# Patient Record
Sex: FEMALE | Race: WHITE | Employment: UNEMPLOYED | URBAN - METROPOLITAN AREA
[De-identification: names, ages, dates, MRNs, and addresses within clinical notes are randomized per-mention and may not be internally consistent; named-entity substitution may affect disease eponyms.]

---

## 2017-03-06 ENCOUNTER — TRANSFERRED RECORDS (OUTPATIENT)
Dept: HEALTH INFORMATION MANAGEMENT | Facility: CLINIC | Age: 52
End: 2017-03-06

## 2017-07-07 ENCOUNTER — CARE COORDINATION (OUTPATIENT)
Dept: SURGERY | Facility: CLINIC | Age: 52
End: 2017-07-07

## 2017-07-07 NOTE — PROGRESS NOTES
"Called and spoke to patient regarding records we received from [facility].     Patient states she has history of gastric bypass into 2006, as well as a GJ revision performed in 2012 for her ulcer. All her surgeries were performed by Dr. Barajas in [facility].      She is seeking our help for treatment of her ulcer,she states it is still not healed according to the EGD done in Spring of 2017, which was not sent with her records. She was upset that her provider placed her on a soft diet for six months and she states that she is just not able to do that and was very upset with how she was treated there.  She states that they accused her of smoking even though she was telling them she had quit. She does not drink caffeine, however, her notes from her clinic stated that she did show up to her appointments with mocha's and other coffee drinks.    She states \"They were lying in the notes they sent. I read them, that is why I decided to leave their clinic\".    She is currently taking Carafate four times a day as well as Prilosec 20 mg BID. She states her symptoms consist of pain in her mid line that radiate to the left of her abdomen, nonspecific. Nausea daily, she notes no triggers,  emesis 3 to 4 times a week with no triggers. She states she has been following doctor Leiva at [facility] but would like to transfer her care here for treatment of her ulcer. We do not have a referral from her primary doctor. She was asked to have a referral letter sent to us from her primary care provider listing all of her current health concerns, as well as reason she is pursing care here. She does state she is self referred, and was advised, this is required by our team. She will also work on getting her 2017 endoscopy report sent to us as well as any recent imaging done within the last year as well as a CD or get them electronically sent to us.     We will review her records and call her once we have a plan. Patient " verbalized understanding and agreed with plan.

## 2017-07-10 ENCOUNTER — CARE COORDINATION (OUTPATIENT)
Dept: SURGERY | Facility: CLINIC | Age: 52
End: 2017-07-10

## 2017-07-10 NOTE — PROGRESS NOTES
Reviewed records with Dr. Gonzalez. He is happy to see patient in clinic, however, patient needs to be aware that only procedure we may offer her is a reversal of her gastric bypass. Patient also needs to know that she will gain all her weigh back. If she is ok to discuss this, we can see her in clinic. Also, we need to have her recent EGD also.     Called and left message on patient voicemail with brief message requesting call back.

## 2017-07-13 NOTE — PROGRESS NOTES
"\"Reviewed records with Dr. Gonzalez. He is happy to see patient in clinic, however, patient needs to be aware that only procedure we may offer her is a reversal of her gastric bypass. Patient also needs to know that she will gain all her weigh back. If she is ok to discuss this, we can see her in clinic. Also, we need to have her recent EGD also. \"    Patient called back.  She states she is ok with gaining her weight back and discussing reversal. Ok with also possibly having a repeat EGD. She will continue to work on getting her referral letter to us as well as her recent EGD.    After we received this info we can schedule her for consult.  "

## 2017-08-30 ENCOUNTER — OFFICE VISIT (OUTPATIENT)
Dept: SURGERY | Facility: CLINIC | Age: 52
End: 2017-08-30

## 2017-08-30 ENCOUNTER — ALLIED HEALTH/NURSE VISIT (OUTPATIENT)
Dept: SURGERY | Facility: CLINIC | Age: 52
End: 2017-08-30

## 2017-08-30 VITALS
DIASTOLIC BLOOD PRESSURE: 56 MMHG | HEIGHT: 65 IN | SYSTOLIC BLOOD PRESSURE: 123 MMHG | OXYGEN SATURATION: 99 % | HEART RATE: 84 BPM | BODY MASS INDEX: 33.76 KG/M2 | WEIGHT: 202.6 LBS

## 2017-08-30 DIAGNOSIS — Z98.84 BARIATRIC SURGERY STATUS: Primary | ICD-10-CM

## 2017-08-30 RX ORDER — SUCRALFATE 1 G/1
TABLET ORAL 4 TIMES DAILY
COMMUNITY
End: 2018-02-07

## 2017-08-30 NOTE — LETTER
"2017       RE: Savanah Payton  [address]   [facility] [address]     Dear Colleague,    Thank you for referring your patient, Savanha Payton, to the [facility] SURGICAL WEIGHT MANAGEMENT at [facility]. Please see a copy of my visit note below.     New Bariatric Surgery Re-Establish Care/Referral Consultation      RE: Savanah Payton  MR#: [id number]  : 1965  ULISES: 2017      Requesting provider: Sapna Thompson    Chief Complaint/Reason for visit: evaluation for possible weight loss surgery    Dear No primary care provider on file.,    I had the pleasure of seeing your patient, Savanah Payton, today in our bariatric surgery clinic.  As you know, Savanah Payton is 52 year old.  She has a height of 5' 4.5\", a weight of 202 lbs 9.6 oz, and calculated Body mass index is 34.24 kg/(m^2)..  She has a history of weight loss surgery.    HISTORY OF PRESENT ILLNESS:  Past weight loss procedure: RYGB (laparoscopic) in  in [place], [place].; laparoscopic revision for marginal ulcers in   Weight before weight loss procedure was 314  Lowest weight after weight loss procedure was unknown 150  Weight today is 202 lbs 9.6 oz.  Patient is interested in establishing bariatric care: Yes  Patient is interested in possible revision bariatric procedure: Yes - reversal, not revision     For the past year her epigastric pain has returned.  This is the same pain that preceded her revision surgery in .  It is associated with nausea and retching.  She has had numerous EGD's, mostly at [place], the most recent of which (2016) showed a marginal ulceration on the intestinal side of her anastomosis.      Weight Loss History Reviewed with Patient 2017   How long have you been overweight? Since puberty   What is the most that you have ever weighed? 314   What is the most weight you have lost? 150   I have tried the following methods to lose weight Watching portions or " calories, Exercise, Weight Watchers, Atkins type diet (low carb/high protein), OTC Medications, Weight Loss Surgery   I have tried the following weight loss medications? (Check all that apply) None   Have you ever had weight loss surgery? Yes   Please select the type of weight loss surgery you had (select all that apply): gastric bypass / Aaron-en-Y       CO-MORBIDITIES OF OBESITY INCLUDE:  No past medical history on file.    No past surgical history on file.    FAMILY HISTORY:   No family history on file.    SOCIAL HISTORY:   Social History Questions Reviewed With Patient 8/18/2017   Which best describes your employment status (select all that apply) I am disabled   Which best describes your marital status:    Do you have children? Yes   Who do you have in your support network that can be available to help you for the first 2 weeks after surgery? Yes   Who can you count on for support throughout your weight loss surgery journey? Ex  and mom   Can you afford 3 meals a day?  Yes   Can you afford 50-60 dollars a month for vitamins? Yes       PSYCHOLOGICAL HISTORY:   Psychological History Reviewed With Patient 8/18/2017   Have you ever attempted suicide? Never.   Have you had thoughts of suicide in the past year? No   Have you ever been hospitalized for mental illness or a suicide attempt? Never.   Do you have a history of chronic pain? Yes   Have you ever been diagnosed with fibromyalgia? Yes   Are you currently being treated for any of the following? (select all that apply) Depression, Anxiety   Are you currently seeing a therapist or counselor?  No   Are you currently seeing a psychiatrist? No       ROS    MEDICATIONS:  Current Outpatient Prescriptions   Medication     sucralfate (CARAFATE) 1 GM tablet     Cyanocobalamin (VITAMIN B-12 PO)     LEVOTHYROXINE SODIUM PO     BusPIRone HCl (BUSPAR PO)     MAGNESIUM OXIDE PO     GABAPENTIN PO     RANITIDINE HCL PO     TOPIRAMATE PO     PROMETHAZINE HCL RE      "LISINOPRIL PO     calcium carb 1250 mg, 500 mg [place],/vitamin D 200 units (OSCAL WITH D) 500-200 MG-UNIT per tablet     No current facility-administered medications for this visit.        ALLERGIES:  Allergies   Allergen Reactions     Bactrim [Sulfamethoxazole W/Trimethoprim]      Prilosec [Omeprazole]        /56  Pulse 84  Ht 5' 4.5\"  Wt 202 lb 9.6 oz  SpO2 99%  BMI 34.24 kg/m2  PHYSICAL EXAM:  Neurological: A & O x 3  Eyes: PERRL  ENT: mucous membranes moist  Skin: warm and dry  Musculoskeletal: gait intact  Respiratory: Respirations unlabored  Abdomen: soft, tender to palpation in the epigastrium (very slight), obese, not distended    In summary, Savanah Payton is a 52 year old female who has a history of Gastric Bypass who is interested in reversal due to recurrent ulcer disease.    PLAN:    -smoking cessation  -EGD in the next few weeks/months  -continuing discussion on the possibility of gastric bypass reversal    FOLLOW-UP:   -following EGD    Prieto Prajapati MD  Chief Resident     I saw and evaluated the patient. I agree with the findings and the plan of care as documented in the resident s note.    Mango Gonzalez MD      "

## 2017-08-30 NOTE — PATIENT INSTRUCTIONS
It was a pleasure meeting with you today.     Thank you for allowing us the privilege of caring for you. We hope we provided you with the excellent service you deserve.     Please let us know if there is anything else we can do for you so that we can be sure you are leaving completely satisfied with your care experience.      You saw Dr Gonzalez today.     Instructions per today's visit:     To schedule your endoscopy with Dr Gonzalez, call Jorge at [phone].  Date:__________  Time:__________    Endoscopy Instructions:  -Have nothing to eat or drink for 6 hours prior to the check-in time.   -Check-in 1 hour before the procedure time.  -Please bring a  to escort you home after the procedure.  -You cannot drive for 24 hours after the procedure due to the sedation.  -The Endoscopy Department is located on the 1st floor of the [facility].  -The [facility] is at [address] [place] [address].    -Ph: [phone] for directions or nursing questions.  -[place] [employer] parking is available for the same price as parking in the [employer] ramp.      To schedule appointments with our team, please call [phone] option #1    Please call during clinic hours Monday through Friday 8:00a - 4:00p if you have questions or you can contact us via [employer] at anytime.      Nurses: [phone] Option # 3 for nurse advice line.  Fax: [phone]  Surgery Scheduler: [phone]    Please call the hospital at [phone] to speak with our on call MDs if you have urgent needs after hours, during weekends, or holidays.

## 2017-08-30 NOTE — PATIENT INSTRUCTIONS
Goals:  1) Consume 60 grams of protein/day. Re-start protein shakes for meal replacements.   2) Start trying soft-solid protein as tolerated.   3) Sip on 48-64 oz of calorie-free/caffeine-free fluids/day- between meals only.  4) Eat slowly (>20 min/meal), chewing foods well (to applesauce-like consistency).  5) Limit portions to 1 cup/meal.  6) Take the following after a Aaron-en-y Gastric Bypass:    Multivitamin/minerals: adult dose 2 times daily    Calcium Citrate containing vitamin D: 500 mg 3 times daily or 600 mg 2 times daily    Vitamin B12: sublingual form of at least 500 mcg daily or injection of 1000 mcg monthly     Vitamin D3: 1,000 International Units 2x/day    Iron: 45-60mg daily    Ambreen Estrella RD, LD  Voicemail: [phone]  Appointments: [phone]

## 2017-08-30 NOTE — NURSING NOTE
"Chief Complaint   Patient presents with     New Patient     Ulcer       Vitals:    08/30/17 1249   BP: 123/56   Pulse: 84   SpO2: 99%   Weight: 202 lb 9.6 oz   Height: 5' 4.5\"       Body mass index is 34.24 kg/(m^2).    Ashlee Alicea CMA                          "

## 2017-08-30 NOTE — PROGRESS NOTES
"New Bariatric Surgery Nutrition Consult  Reason For Visit:  Savanah Payton is a 52 year old female presenting today for a nutrition consult, s/p RNY GB in 2006 (in [place]). Pt referred by Dr. Gonzalez (8/30/17).    Anthropometrics:  Highest Weight before surgery: 314 lbs  Lowest Weight since surgery: 150 lbs  Current Weight: 202.6 lbs    Nutrition History:  Current Vitamins/Minerals: Calcium citrate, Mag Ox, 2,500mcg Sublingual vitamin B12, 50 mcg vitamin K3, 1,000 International Units vitamin D3 2x/day. Pt reports taking vitamins 2-3 days a week.     Pt with chronic ulcers making it difficult to eat most solid foods, especially protein.     Nutrition Prescription:  Grams Protein: 60 (minimum)  Amount of Fluid: 48-64 oz    Nutrition Diagnosis  Obesity r/t long history of self-monitoring deficit and excessive energy intake with history of RNY GB aeb BMI >30.    Intervention  Materials/Education provided on the bariatric maintenance diet, protein intake, fluid intake, eating pace, chewing foods well, portion control, sugar/fat intake, and recommended vitamin/mineral supplements. Gave encouragement and support. Requested annual bariatric labs be ordered. Provided pt with \"Maintenance Diet After Bariatric Surgery\" handout, \"Sources of Protein\" handout, \"After Gastric Bypass: Vitamin and Mineral Supplements\" handout, list of goals, and RD contact information.     Patient Understanding: Good  Expected Compliance: Good    Goals:  1) Consume 60 grams of protein/day. Re-start protein shakes for meal replacements.   2) Start trying soft-solid protein as tolerated.   3) Sip on 48-64 oz of calorie-free/caffeine-free fluids/day- between meals only.  4) Eat slowly (>20 min/meal), chewing foods well (to applesauce-like consistency).  5) Limit portions to 1 cup/meal.  6) Take the following after a Aaron-en-y Gastric Bypass:    Multivitamin/minerals: adult dose 2 times daily    Calcium Citrate containing vitamin D: 500 mg 3 times " daily or 600 mg 2 times daily    Vitamin B12: sublingual form of at least 500 mcg daily or injection of 1000 mcg monthly     Vitamin D3: 1,000 International Units 2x/day    Iron: 45-60mg daily       Follow-Up: prn    Time spent with patient: 30 minutes    Rose Mary Estrella RD, LD  Pager: [phone]

## 2017-08-30 NOTE — MR AVS SNAPSHOT
After Visit Summary   8/30/2017    Savanah Payton    MRN: [id number]           Patient Information     Date Of Birth          1965        Visit Information        Provider Department      8/30/2017 1:30 PM Mango Gonzalez MD [facility] Surgical Weight Management        Care Instructions    It was a pleasure meeting with you today.     Thank you for allowing us the privilege of caring for you. We hope we provided you with the excellent service you deserve.     Please let us know if there is anything else we can do for you so that we can be sure you are leaving completely satisfied with your care experience.      You saw Dr Gonzalez today.     Instructions per today's visit:     To schedule your endoscopy with Dr Gonzalez, call Jorge at [phone].  Date:__________  Time:__________    Endoscopy Instructions:  -Have nothing to eat or drink for 6 hours prior to the check-in time.   -Check-in 1 hour before the procedure time.  -Please bring a  to escort you home after the procedure.  -You cannot drive for 24 hours after the procedure due to the sedation.  -The Endoscopy Department is located on the 1st floor of the [facility].  -The [facility] is at [address] [place] [address].    -Ph: [phone] for directions or nursing questions.  -[employer] parking is available for the same price as parking in the [employer] ramp.      To schedule appointments with our team, please call [phone] option #1    Please call during clinic hours Monday through Friday 8:00a - 4:00p if you have questions or you can contact us via [employer] at anytime.      Nurses: [phone] Option # 3 for nurse advice line.  Fax: [phone]  Surgery Scheduler: [phone]    Please call the [facility] at [phone] to speak with our on call MDs if you have urgent needs after hours, during weekends, or holidays.              Follow-ups after your visit       "  Who to contact     Please call your clinic at [phone] to:    Ask questions about your health    Make or cancel appointments    Discuss your medicines    Learn about your test results    Speak to your doctor   If you have compliments or concerns about an experience at your clinic, or if you wish to file a complaint, please contact [facility] Patient Relations at [phone] or email us at [email or web address]         Additional Information About Your Visit        [employer] Information     [employer] gives you secure access to your electronic health record. If you see a primary care provider, you can also send messages to your care team and make appointments. If you have questions, please call your primary care clinic.  If you do not have a primary care provider, please call [phone] and they will assist you.      [employer] is an electronic gateway that provides easy, online access to your medical records. With [employer], you can request a clinic appointment, read your test results, renew a prescription or communicate with your care team.     To access your existing account, please contact your [facility] or call [phone] for assistance.        Care EveryWhere ID     This is your Care EveryWhere ID. This could be used by other organizations to access your [place] medical records  [id number]        Your Vitals Were     Pulse Height Pulse Oximetry BMI (Body Mass Index)          84 1.638 m (5' 4.5\") 99% 34.24 kg/m2         Blood Pressure from Last 3 Encounters:   08/30/17 123/56    Weight from Last 3 Encounters:   08/30/17 91.9 kg (202 lb 9.6 oz)              Today, you had the following     No orders found for display       Primary Care Provider    None Specified       No primary provider on file.        Equal Access to Services     VARSHA HARKINS AH: johnson Luong, david che " justice fierrolenorelayo coleman'aan ah. So [facility] [phone].    ATENCIÓN: Si oraliala carolina, tiene a kearns disposición servicios gratuitos de asistencia lingüística. Paramjit al [phone].    We comply with applicable federal civil rights laws and [place] laws. We do not discriminate on the basis of race, color, national origin, age, disability sex, sexual orientation or gender identity.            Thank you!     Thank you for choosing [facility] SURGICAL WEIGHT MANAGEMENT  for your care. Our goal is always to provide you with excellent care. Hearing back from our patients is one way we can continue to improve our services. Please take a few minutes to complete the written survey that you may receive in the mail after your visit with us. Thank you!             Your Updated Medication List - Protect others around you: Learn how to safely use, store and throw away your medicines at www.disposemymeds.org.          This list is accurate as of: 8/30/17  2:01 PM.  Always use your most recent med list.                   Brand Name Dispense Instructions for use Diagnosis    BUSPAR PO      Take 15 mg by mouth 2 times daily        calcium carb 1250 mg (500 mg [place])/vitamin D 200 units 500-200 MG-UNIT per tablet    OSCAL with D     Take 1 tablet by mouth 2 times daily (with meals)        GABAPENTIN PO      Take 800 mg by mouth 4 times daily        LEVOTHYROXINE SODIUM PO      Take 150 mcg by mouth daily        LISINOPRIL PO      Take 10 mg by mouth daily        MAGNESIUM OXIDE PO      Take 500 mg by mouth daily        PROMETHAZINE HCL RE      Place 25 mg rectally daily        RANITIDINE HCL PO      Take 150 mg by mouth 2 times daily        sucralfate 1 GM tablet    CARAFATE     Take by mouth 4 times daily        TOPIRAMATE PO      Take 100 mg by mouth 3 times daily        VITAMIN B-12 PO      Take 2,500 mcg by mouth daily

## 2017-08-30 NOTE — MR AVS SNAPSHOT
MRN:[id number]                      After Visit Summary   8/30/2017    Savanah Payton    MRN: [id number]           Visit Information        Provider Department      8/30/2017 2:00 PM Rose Mary Estrella RD [facility] Surgical Weight Management        Care Instructions    Goals:  1) Consume 60 grams of protein/day. Re-start protein shakes for meal replacements.   2) Start trying soft-solid protein as tolerated.   3) Sip on 48-64 oz of calorie-free/caffeine-free fluids/day- between meals only.  4) Eat slowly (>20 min/meal), chewing foods well (to applesauce-like consistency).  5) Limit portions to 1 cup/meal.  6) Take the following after a Aaron-en-y Gastric Bypass:    Multivitamin/minerals: adult dose 2 times daily    Calcium Citrate containing vitamin D: 500 mg 3 times daily or 600 mg 2 times daily    Vitamin B12: sublingual form of at least 500 mcg daily or injection of 1000 mcg monthly     Vitamin D3: 1,000 International Units 2x/day    Iron: 45-60mg daily    Ambreen Estrella RD, LD  Voicemail: [phone]  Appointments: [phone]           [employer] Information     [employer] gives you secure access to your electronic health record. If you see a primary care provider, you can also send messages to your care team and make appointments. If you have questions, please call your primary care clinic.  If you do not have a primary care provider, please call [phone] and they will assist you.      [employer] is an electronic gateway that provides easy, online access to your medical records. With [employer], you can request a clinic appointment, read your test results, renew a prescription or communicate with your care team.     To access your existing account, please contact your [facility] or call [phone] for assistance.        Care EveryWhere ID     This is your Care EveryWhere ID. This could be used by other organizations to access your [facility] medical records  [id number]         Equal Access to Services     VARSHA HARKINS : Alfred Nguyễn, wahalima key, david che. So [facility] [phone].    ATENCIÓN: Si habla español, tiene a kearns disposición servicios gratuitos de asistencia lingüística. Llame al [phone].    We comply with applicable federal civil rights laws and [place] laws. We do not discriminate on the basis of race, color, national origin, age, disability sex, sexual orientation or gender identity.

## 2017-08-30 NOTE — PROGRESS NOTES
" New Bariatric Surgery Re-Establish Care/Referral Consultation      RE: Savanah Payton  MR#: [id number]  : 1965  ULISES: 2017      Requesting provider: Sapna Thompson    Chief Complaint/Reason for visit: evaluation for possible weight loss surgery    Dear No primary care provider on file.,    I had the pleasure of seeing your patient, Savanah Payton, today in our bariatric surgery clinic.  As you know, Savanah Payton is 52 year old.  She has a height of 5' 4.5\", a weight of 202 lbs 9.6 oz, and calculated Body mass index is 34.24 kg/(m^2)..  She has a history of weight loss surgery.    HISTORY OF PRESENT ILLNESS:  Past weight loss procedure: RYGB (laparoscopic) in  in [place], [place].; laparoscopic revision for marginal ulcers in   Weight before weight loss procedure was 314  Lowest weight after weight loss procedure was unknown 150  Weight today is 202 lbs 9.6 oz.  Patient is interested in establishing bariatric care: Yes  Patient is interested in possible revision bariatric procedure: Yes - reversal, not revision     For the past year her epigastric pain has returned.  This is the same pain that preceded her revision surgery in .  It is associated with nausea and retching.  She has had numerous EGD's, mostly at [facility], the most recent of which (2016) showed a marginal ulceration on the intestinal side of her anastomosis.      Weight Loss History Reviewed with Patient 2017   How long have you been overweight? Since puberty   What is the most that you have ever weighed? 314   What is the most weight you have lost? 150   I have tried the following methods to lose weight Watching portions or calories, Exercise, Weight Watchers, Atkins type diet (low carb/high protein), OTC Medications, Weight Loss Surgery   I have tried the following weight loss medications? (Check all that apply) None   Have you ever had weight loss surgery? Yes   Please select the type of weight loss surgery you had " (select all that apply): gastric bypass / Aaron-en-Y       CO-MORBIDITIES OF OBESITY INCLUDE:  No past medical history on file.    No past surgical history on file.    FAMILY HISTORY:   No family history on file.    SOCIAL HISTORY:   Social History Questions Reviewed With Patient 8/18/2017   Which best describes your employment status (select all that apply) I am disabled   Which best describes your marital status:    Do you have children? Yes   Who do you have in your support network that can be available to help you for the first 2 weeks after surgery? Yes   Who can you count on for support throughout your weight loss surgery journey? Ex  and mom   Can you afford 3 meals a day?  Yes   Can you afford 50-60 dollars a month for vitamins? Yes       PSYCHOLOGICAL HISTORY:   Psychological History Reviewed With Patient 8/18/2017   Have you ever attempted suicide? Never.   Have you had thoughts of suicide in the past year? No   Have you ever been hospitalized for mental illness or a suicide attempt? Never.   Do you have a history of chronic pain? Yes   Have you ever been diagnosed with fibromyalgia? Yes   Are you currently being treated for any of the following? (select all that apply) Depression, Anxiety   Are you currently seeing a therapist or counselor?  No   Are you currently seeing a psychiatrist? No       ROS    MEDICATIONS:  Current Outpatient Prescriptions   Medication     sucralfate (CARAFATE) 1 GM tablet     Cyanocobalamin (VITAMIN B-12 PO)     LEVOTHYROXINE SODIUM PO     BusPIRone HCl (BUSPAR PO)     MAGNESIUM OXIDE PO     GABAPENTIN PO     RANITIDINE HCL PO     TOPIRAMATE PO     PROMETHAZINE HCL RE     LISINOPRIL PO     calcium carb 1250 mg, 500 mg [place],/vitamin D 200 units (OSCAL WITH D) 500-200 MG-UNIT per tablet     No current facility-administered medications for this visit.        ALLERGIES:  Allergies   Allergen Reactions     Bactrim [Sulfamethoxazole W/Trimethoprim]      Prilosec  "[Omeprazole]        /56  Pulse 84  Ht 5' 4.5\"  Wt 202 lb 9.6 oz  SpO2 99%  BMI 34.24 kg/m2    PHYSICAL EXAM:  Neurological: A & O x 3  Eyes: PERRL  ENT: mucous membranes moist  Skin: warm and dry  Musculoskeletal: gait intact  Respiratory: Respirations unlabored  Abdomen: soft, tender to palpation in the epigastrium (very slight), obese, not distended    In summary, Savanah Payton is a 52 year old female who has a history of Gastric Bypass who is interested in reversal due to recurrent ulcer disease.    PLAN:    -smoking cessation  -EGD in the next few weeks/months  -continuing discussion on the possibility of gastric bypass reversal    FOLLOW-UP:   -following EGD    Prieto Prajapati MD  Chief Resident     I saw and evaluated the patient. I agree with the findings and the plan of care as documented in the resident s note.    Mango Gonzalez MD            "

## 2017-09-01 ENCOUNTER — CARE COORDINATION (OUTPATIENT)
Dept: SURGERY | Facility: CLINIC | Age: 52
End: 2017-09-01

## 2017-09-01 DIAGNOSIS — K26.9 RECURRENT DUODENAL ULCER: Primary | ICD-10-CM

## 2017-09-01 NOTE — PROGRESS NOTES
PT REQUESTING F/U APPT - DR MOON  Received: Yesterday       Laura Johnston Nisha D, RN       Phone Number: [phone]                     Pt called and is requesting a follow-up appointment with Dr. Moon to review results from endoscopy. I tried to schedule, but schedule for Dr. Moon is out to December and Pt doesn't want to wait that long and requested message be sent to see if she could be seen sooner.       Please give Pt a call back at [phone].     Thank you,     Laura   Call Center     Please DO NOT send message and or reply back to sender. Call center Representatives DO NOT respond to Messages.                Called and spoke to patient. Follow up appointment scheduled for 10/11/17, does not want to see RD again.

## 2017-09-07 ENCOUNTER — TRANSFERRED RECORDS (OUTPATIENT)
Dept: HEALTH INFORMATION MANAGEMENT | Facility: CLINIC | Age: 52
End: 2017-09-07

## 2017-09-20 ENCOUNTER — TELEPHONE (OUTPATIENT)
Dept: GASTROENTEROLOGY | Facility: CLINIC | Age: 52
End: 2017-09-20

## 2017-09-26 ENCOUNTER — HOSPITAL ENCOUNTER (OUTPATIENT)
Facility: CLINIC | Age: 52
Discharge: HOME | End: 2017-09-26
Attending: SURGERY | Admitting: SURGERY
Payer: MEDICARE

## 2017-09-26 VITALS
DIASTOLIC BLOOD PRESSURE: 65 MMHG | OXYGEN SATURATION: 100 % | RESPIRATION RATE: 10 BRPM | SYSTOLIC BLOOD PRESSURE: 107 MMHG

## 2017-09-26 LAB — UPPER GI ENDOSCOPY: NORMAL

## 2017-09-26 PROCEDURE — 99152 MOD SED SAME PHYS/QHP 5/>YRS: CPT | Performed by: SURGERY

## 2017-09-26 PROCEDURE — 88305 TISSUE EXAM BY PATHOLOGIST: CPT | Performed by: SURGERY

## 2017-09-26 PROCEDURE — 25000128 H RX IP 250 OP 636: Performed by: SURGERY

## 2017-09-26 PROCEDURE — 43239 EGD BIOPSY SINGLE/MULTIPLE: CPT | Performed by: SURGERY

## 2017-09-26 RX ORDER — CYANOCOBALAMIN 1000 UG/ML
1000 INJECTION, SOLUTION INTRAMUSCULAR; SUBCUTANEOUS ONCE
Status: COMPLETED | OUTPATIENT
Start: 2017-09-26 | End: 2017-09-26

## 2017-09-26 RX ORDER — ONDANSETRON 2 MG/ML
4 INJECTION INTRAMUSCULAR; INTRAVENOUS
Status: COMPLETED | OUTPATIENT
Start: 2017-09-26 | End: 2017-09-26

## 2017-09-26 RX ORDER — FENTANYL CITRATE 50 UG/ML
INJECTION, SOLUTION INTRAMUSCULAR; INTRAVENOUS PRN
Status: DISCONTINUED | OUTPATIENT
Start: 2017-09-26 | End: 2017-09-26 | Stop reason: HOSPADM

## 2017-09-26 RX ORDER — LIDOCAINE 40 MG/G
CREAM TOPICAL
Status: DISCONTINUED | OUTPATIENT
Start: 2017-09-26 | End: 2017-09-26 | Stop reason: HOSPADM

## 2017-09-26 RX ADMIN — ONDANSETRON 4 MG: 2 INJECTION INTRAMUSCULAR; INTRAVENOUS at 10:58

## 2017-09-26 RX ADMIN — CYANOCOBALAMIN 1000 MCG: 1000 INJECTION, SOLUTION INTRAMUSCULAR at 11:15

## 2017-09-26 NOTE — DISCHARGE INSTRUCTIONS
Discharge Instructions after  Upper Endoscopy (EGD)    Activity and Diet  You were given medicine for pain. You may be dizzy or sleepy.  For 24 hours:    Do not drive or use heavy equipment.    Do not make important decisions.    Do not drink any alcohol.   You may return to your regular diet.    Discomfort  You may have a sore throat for 2 to 3 days. It may help to:    Avoid hot liquids for 24 hours.    Use sore throat lozenges.    Gargle as needed with salt water up to 4 times a day. Mix 1 cup of warm water  with 1 teaspoon of salt. Do not swallow.  ___ Your esophagus was dilated (opened) or banded during the exam:    Drink only cool liquids for the rest of the day. Eat a soft diet for the next few days.    You may have a sore chest for 2 to 3 days.    You may take Tylenol (acetaminophen) for pain unless your doctor has told you not to.    Do not take aspirin or ibuprofen (Advil, Motrin) or other NSAIDS  (anti-inflammatory drugs) for 3 days.    Follow-up   We took small tissue samples for study. If you do not have a follow-up visit scheduled,  call your provider s office in 2 weeks for the results.      When to call us:  Problems are rare. Call right away if you have:    Unusual throat pain or trouble swallowing    Unusual pain in belly or chest that is not relieved by belching or passing air    Black stools (tar-like looking bowel movement)    Temperature above 100.6  F. (37.5  C).    If you vomit blood or have severe pain, go to an emergency room.    If you have questions, call:  Monday to Friday, 7 a.m. to 4:30 p.m.: Endoscopy: [phone] (We may have to call you back)    After hours: Hospital: [phone] (Ask for the GI fellow on call)

## 2017-09-26 NOTE — OR NURSING
Procedure: EGD with biopsy  Sedation: Consious  O2: 2L   Tolerated Procedure: Well  Patient returned to recovery room in stable condition with RN  Other: Will order a b-12 injection to get in recovery  Yvonne Arcos RN

## 2017-09-26 NOTE — IP AVS SNAPSHOT
[facility], [place], Endoscopy    [address] [address]    Phone:  [phone]                                       After Visit Summary   9/26/2017    Savanah Payton    MRN: [id number]           After Visit Summary Signature Page     I have received my discharge instructions, and my questions have been answered. I have discussed any challenges I see with this plan with the nurse or doctor.    ..........................................................................................................................................  Patient/Patient Representative Signature      ..........................................................................................................................................  Patient Representative Print Name and Relationship to Patient    ..................................................               ................................................  Date                                            Time    ..........................................................................................................................................  Reviewed by Signature/Title    ...................................................              ..............................................  Date                                                            Time

## 2017-09-26 NOTE — IP AVS SNAPSHOT
MRN:[id number]                      After Visit Summary   9/26/2017    Savanah Payton    MRN: [id number]           Thank you!     Thank you for choosing [facility] for your care. Our goal is always to provide you with excellent care. Hearing back from our patients is one way we can continue to improve our services. Please take a few minutes to complete the written survey that you may receive in the mail after you visit with us. Thank you!        Patient Information     Date Of Birth          1965        About your hospital stay     You were admitted on:  September 26, 2017 You last received care in the:  [facility], Endoscopy    You were discharged on:  September 26, 2017       Who to Call     For medical emergencies, please call 911.  For non-urgent questions about your medical care, please call your primary care provider or clinic, None  For questions related to your surgery, please call your surgery clinic        Attending Provider     Provider Specialty    Mango Gonzalez MD General Surgery       Primary Care Provider    None Specified      Your next 10 appointments already scheduled     Oct 11, 2017  1:45 PM CDT   (Arrive by 1:30 PM)   RETURN BARIATRIC SURGERY with Mango Gonzalez MD   [facility] ([facility])    [address] [place] [id number] [phone]              Further instructions from your care team       Discharge Instructions after  Upper Endoscopy (EGD)    Activity and Diet  You were given medicine for pain. You may be dizzy or sleepy.  For 24 hours:    Do not drive or use heavy equipment.    Do not make important decisions.    Do not drink any alcohol.   You may return to your regular diet.    Discomfort  You may have a sore throat for 2 to 3 days. It may help to:    Avoid hot liquids for 24 hours.    Use sore throat lozenges.    Gargle as needed with salt water up to 4 times a day. Mix 1  cup of warm water  with 1 teaspoon of salt. Do not swallow.  ___ Your esophagus was dilated (opened) or banded during the exam:    Drink only cool liquids for the rest of the day. Eat a soft diet for the next few days.    You may have a sore chest for 2 to 3 days.    You may take Tylenol (acetaminophen) for pain unless your doctor has told you not to.    Do not take aspirin or ibuprofen (Advil, Motrin) or other NSAIDS  (anti-inflammatory drugs) for 3 days.    Follow-up   We took small tissue samples for study. If you do not have a follow-up visit scheduled,  call your provider s office in 2 weeks for the results.      When to call us:  Problems are rare. Call right away if you have:    Unusual throat pain or trouble swallowing    Unusual pain in belly or chest that is not relieved by belching or passing air    Black stools (tar-like looking bowel movement)    Temperature above 100.6  F. (37.5  C).    If you vomit blood or have severe pain, go to an emergency room.    If you have questions, call:  Monday to Friday, 7 a.m. to 4:30 p.m.: Endoscopy: [phone] (We may have to call you back)    After hours: Hospital: [phone] (Ask for the GI fellow on call)    Pending Results     No orders found from 9/24/2017 to 9/27/2017.            Admission Information     Date & Time Provider Department Dept. Phone    9/26/2017 Mango Gonzalez MD [facility], [place], Endoscopy [phone]      Your Vitals Were     Blood Pressure Respirations Pulse Oximetry             130/99 23 95%         MyChart Information     [employer] gives you secure access to your electronic health record. If you see a primary care provider, you can also send messages to your care team and make appointments. If you have questions, please call your primary care clinic.  If you do not have a primary care provider, please call [phone] and they will assist you.        Care EveryWhere ID     This is your Care EveryWhere ID. This could be used by other  organizations to access your [place] medical records  [id number]        Equal Access to Services     VARSHA HARKINS : Alfred Nguyễn, johnson key, david che. [facility] [phone].    ATENCIÓN: Si habla español, tiene a kearns disposición servicios gratuitos de asistencia lingüística. Llame al [phone].    We comply with applicable federal civil rights laws and [place] laws. We do not discriminate on the basis of race, color, national origin, age, disability sex, sexual orientation or gender identity.               Review of your medicines      UNREVIEWED medicines. Ask your doctor about these medicines        Dose / Directions    BUSPAR PO        Dose:  15 mg   Take 15 mg by mouth 2 times daily   Refills:  0       calcium carb 1250 mg (500 mg [place])/vitamin D 200 units 500-200 MG-UNIT per tablet   Commonly known as:  OSCAL with D        Dose:  1 tablet   Take 1 tablet by mouth 2 times daily (with meals)   Refills:  0       GABAPENTIN PO        Dose:  800 mg   Take 800 mg by mouth 4 times daily   Refills:  0       LEVOTHYROXINE SODIUM PO        Dose:  137 mcg   Take 137 mcg by mouth daily   Refills:  0       LISINOPRIL PO        Dose:  10 mg   Take 10 mg by mouth daily   Refills:  0       MAGNESIUM OXIDE PO        Dose:  500 mg   Take 500 mg by mouth daily   Refills:  0       PROMETHAZINE HCL RE        Dose:  25 mg   Place 25 mg rectally daily   Refills:  0       RANITIDINE HCL PO        Dose:  150 mg   Take 150 mg by mouth 2 times daily   Refills:  0       sucralfate 1 GM tablet   Commonly known as:  CARAFATE        Take by mouth 4 times daily   Refills:  0       TOPIRAMATE PO        Dose:  100 mg   Take 100 mg by mouth 3 times daily   Refills:  0       VITAMIN B-12 PO        Dose:  2500 mcg   Take 2,500 mcg by mouth daily   Refills:  0                Protect others around you: Learn how to safely use, store and throw away your medicines  at www.disposemymeds.org.             Medication List: This is a list of all your medications and when to take them. Check marks below indicate your daily home schedule. Keep this list as a reference.      Medications           Morning Afternoon Evening Bedtime As Needed    BUSPAR PO   Take 15 mg by mouth 2 times daily                                calcium carb 1250 mg (500 mg [place])/vitamin D 200 units 500-200 MG-UNIT per tablet   Commonly known as:  OSCAL with D   Take 1 tablet by mouth 2 times daily (with meals)                                GABAPENTIN PO   Take 800 mg by mouth 4 times daily                                LEVOTHYROXINE SODIUM PO   Take 137 mcg by mouth daily                                LISINOPRIL PO   Take 10 mg by mouth daily                                MAGNESIUM OXIDE PO   Take 500 mg by mouth daily                                PROMETHAZINE HCL RE   Place 25 mg rectally daily                                RANITIDINE HCL PO   Take 150 mg by mouth 2 times daily                                sucralfate 1 GM tablet   Commonly known as:  CARAFATE   Take by mouth 4 times daily                                TOPIRAMATE PO   Take 100 mg by mouth 3 times daily                                VITAMIN B-12 PO   Take 2,500 mcg by mouth daily

## 2017-09-27 LAB — COPATH REPORT: NORMAL

## 2017-10-11 ENCOUNTER — OFFICE VISIT (OUTPATIENT)
Dept: SURGERY | Facility: CLINIC | Age: 52
End: 2017-10-11

## 2017-10-11 DIAGNOSIS — K28.9 MARGINAL ULCER: Primary | ICD-10-CM

## 2017-10-11 NOTE — LETTER
"10/11/2017       RE: Savanah Payton  [address]   [facility] [address]     Dear Colleague,    Thank you for referring your patient, Savanah Payton, to the [facility] SURGICAL WE[facility]NAGEMENT at [facility]. Please see a copy of my visit note below.        Return Bariatric Surgery Note    RE: Savanah Payton  MR#: [id number]  : 1965  VISIT DATE: Oct 11, 2017    Dear Mago Thompson,    I had the pleasure of seeing your patient, Savanah Payton, in my post-bariatric surgery assessment clinic.    CHIEF COMPLAINT: EGD Results    HISTORY OF PRESENT ILLNESS:  Ms Savanah Payton is a 53 yo female s/p RYGB in  ([facility] [place], [place]) and laparoscopic revision for marginal ulcers in , here for EGD follow up and evaluation for RYGB reversal. Has had epigastric pain for the past year and had this pain before her revision. Pain is associated with nausea and vomiting. Since last visit she says she is doing \"horribly\". Takes Prilosec, even though it does not help her, knowing that it gives her explosive diarrhea. Also takes Zantac once a day and carafate 4x a day. \"I am at the end of my rope\". The pain is so bad that she is not sleeping and it is progressively getting worse. Pt is still smoking. Cannot eat or drink from the pain. Most recent EGD (2017) showed a deep ulcer without visible vessel and a small hiatal hernia. Biopsy at the GE junction (2017) indicated mild chronic inflammation in the gastric cardiac-type mucosa. It was negative for H. Pylori and intestinal metaplasia or dysplasia.     Questions Regarding Prior Weight Loss Surgery Reviewed With Patient 10/11/2017   I had the following weight loss procedure: Aaron-en-y Gastric Bypass   What year was your surgery? 2006   How has your weight changed since your last visit? I have lost weight   Are you currently taking any weight loss medications? No   Do you currently have any of the following: Hyperlipidemia (high cholesterol, " triglycerides)?   Have you been to the Emergency room since your last visit with us? No   Were you in the hospital since your last visit with us? No   Do you have any concerns today? yes worsening abdominal pain with insomnia due to it three nights in the past week       Weight History:     10/11/2017   What is your highest lifetime weight? 330   What is your lowest weight since surgery? (In pounds) 138            Questions Regarding Co-Morbidities and Health Concerns Reviewed With Patient 10/11/2017   Pre-diabetes: Stayed the same   Diabetes II: Never   High Blood Pressure: Stayed the same   High cholesterol: Improved   Heartburn/Reflux: Worsened   Sleep apnea: Never   PCOS: Never   Back pain: Stayed the same   Joint pain: Stayed the same   Lower leg swelling: Never       Eating Habits 10/11/2017   How many meals do you eat per day? 2   Do you snack between meals? Sometimes   How much food are you eating at each meal? 1/2 cup to 1 cup   Are you able to separate your meals and liquids by at least 30 minutes? No   Are you able to avoid liquid calories? No       Exercise Questions Reviewed With Patient 10/11/2017   How often do you exercise? Never   What keeps you from being more active?  Pain       Social History:      10/11/2017   Are you smoking? Yes   How much are you smoking? 15  a day   Are you drinking alcohol? Yes   How much alcohol? 1 a week       Medications:  Current Outpatient Prescriptions   Medication     sucralfate (CARAFATE) 1 GM tablet     Cyanocobalamin (VITAMIN B-12 PO)     LEVOTHYROXINE SODIUM PO     BusPIRone HCl (BUSPAR PO)     MAGNESIUM OXIDE PO     GABAPENTIN PO     RANITIDINE HCL PO     TOPIRAMATE PO     PROMETHAZINE HCL RE     LISINOPRIL PO     calcium carb 1250 mg, 500 mg [place],/vitamin D 200 units (OSCAL WITH D) 500-200 MG-UNIT per tablet     No current facility-administered medications for this visit.          10/11/2017   Do you avoid NSAIDs such as (Ibuprofen, Aleve, Naproxen, Advil)?    Yes       ROS:  GI:      10/11/2017   Vomiting: Yes   Diarrhea: Yes   Constipation: No   Swallowing trouble: Yes   Abdominal pain: Yes   Heartburn: Yes   Rash in skin folds: Yes   Depression: Yes   Stress urinary incontinence No     Skin:   BAR RBS ROS - SKIN 10/11/2017   Rash in skin folds: Yes     Psych:      10/11/2017   Depression: Yes   Anxiety: Yes     Female Only:   BAR RBS ROS - FEMALE ONLY 10/11/2017   Female only: Loss of menstrual cycles       LABS/IMAGING/MEDICAL RECORDS REVIEW:   Surgical pathology (9/27/2017):   FINAL DIAGNOSIS:   ESOPHAGUS, Z LINE, BIOPSY:   - Gastric cardiac-type mucosa with mild chronic inflammation   - No H. Pylori like organisms identified in routine staining   - Negative for intestinal metaplasia or dysplasia   Bety Wyatt M.D., PhD, [facility]     EGD (9/27/2017):                                                                            Findings:        Deep ulcer without visible vessel just proximal to the efferent limb. No        stenosis. Small hiatal hernia with LA A esophagitis. bx x 1 at the GE        junction Z line. Pouch 30 ml.                                                                                     Impression:          - No specimens collected.                        - Ulcer-recurrent-marginal; must stop smoking       PHYSICAL EXAMINATION:  General - tearful but in NAD  Pulmonary - breathing comfortably on RA  Abdominal - Soft, non-distended, mildly tender to palpation  Neurologic - alert, oriented, moving all extremities spontaneously       ASSESSMENT AND PLAN:    Ms Savanah Payton is a 51 yo female [place] [place] and laparoscopic revision for marginal ulcers in 2011, presenting with painful ulcer. Plan is for RYGB reversal after patient stops smoking.  1. EGD in 1 mo to evaluate if ulcer is healing.  2. Patient is to stop smoking. She is going to start lozenge program.  3. Follow up in clinic in 1 mo and discuss RYGB reversal  then.    Sincerely,    Jennifer Bermudez, MS3    Staff Physician Comments:  The medical student acted as scribe and the encounter documented above was performed by myself and accurately depicts my evaluation, diagnoses, decisions, treatment, and follow-up plans. Any procedures documented in the student's note was performed by myself with the assistance of the medical student.    Mango Gonzalez MD    I spent a total of 1o minutes face to face with Savanah during today's office visit. Over 50% of this time was spent counseling the patient and/or coordinating care.    Again, thank you for allowing me to participate in the care of your patient.      Sincerely,    Mango Gonzalez MD

## 2017-10-11 NOTE — NURSING NOTE
Chief Complaint   Patient presents with     RECHECK     review EGD        There were no vitals filed for this visit.    There is no height or weight on file to calculate BMI.      Harris FORD

## 2017-10-11 NOTE — PROGRESS NOTES
"    Return Bariatric Surgery Note    RE: Savanah Payton  MR#: [id number]  : 1965  VISIT DATE: Oct 11, 2017    Dear Mago Thompson,    I had the pleasure of seeing your patient, Savanah Payton, in my post-bariatric surgery assessment clinic.    CHIEF COMPLAINT: EGD Results    HISTORY OF PRESENT ILLNESS:  Ms Savanah Payton is a 53 yo female s/p RYGB in  ([place], [place]) and laparoscopic revision for marginal ulcers in , here for EGD follow up and evaluation for RYGB reversal. Has had epigastric pain for the past year and had this pain before her revision. Pain is associated with nausea and vomiting. Since last visit she says she is doing \"horribly\". Takes Prilosec, even though it does not help her, knowing that it gives her explosive diarrhea. Also takes Zantac once a day and carafate 4x a day. \"I am at the end of my rope\". The pain is so bad that she is not sleeping and it is progressively getting worse. Pt is still smoking. Cannot eat or drink from the pain. Most recent EGD (2017) showed a deep ulcer without visible vessel and a small hiatal hernia. Biopsy at the GE junction (2017) indicated mild chronic inflammation in the gastric cardiac-type mucosa. It was negative for H. Pylori and intestinal metaplasia or dysplasia.     Questions Regarding Prior Weight Loss Surgery Reviewed With Patient 10/11/2017   I had the following weight loss procedure: Aaron-en-y Gastric Bypass   What year was your surgery?    How has your weight changed since your last visit? I have lost weight   Are you currently taking any weight loss medications? No   Do you currently have any of the following: Hyperlipidemia (high cholesterol, triglycerides)?   Have you been to the Emergency room since your last visit with us? No   Were you in the hospital since your last visit with us? No   Do you have any concerns today? yes worsening abdominal pain with insomnia due to it three nights in the past week       Weight History:     " 10/11/2017   What is your highest lifetime weight? 330   What is your lowest weight since surgery? (In pounds) 138            Questions Regarding Co-Morbidities and Health Concerns Reviewed With Patient 10/11/2017   Pre-diabetes: Stayed the same   Diabetes II: Never   High Blood Pressure: Stayed the same   High cholesterol: Improved   Heartburn/Reflux: Worsened   Sleep apnea: Never   PCOS: Never   Back pain: Stayed the same   Joint pain: Stayed the same   Lower leg swelling: Never       Eating Habits 10/11/2017   How many meals do you eat per day? 2   Do you snack between meals? Sometimes   How much food are you eating at each meal? 1/2 cup to 1 cup   Are you able to separate your meals and liquids by at least 30 minutes? No   Are you able to avoid liquid calories? No       Exercise Questions Reviewed With Patient 10/11/2017   How often do you exercise? Never   What keeps you from being more active?  Pain       Social History:      10/11/2017   Are you smoking? Yes   How much are you smoking? 15  a day   Are you drinking alcohol? Yes   How much alcohol? 1 a week       Medications:  Current Outpatient Prescriptions   Medication     sucralfate (CARAFATE) 1 GM tablet     Cyanocobalamin (VITAMIN B-12 PO)     LEVOTHYROXINE SODIUM PO     BusPIRone HCl (BUSPAR PO)     MAGNESIUM OXIDE PO     GABAPENTIN PO     RANITIDINE HCL PO     TOPIRAMATE PO     PROMETHAZINE HCL RE     LISINOPRIL PO     calcium carb 1250 mg, 500 mg [place],/vitamin D 200 units (OSCAL WITH D) 500-200 MG-UNIT per tablet     No current facility-administered medications for this visit.          10/11/2017   Do you avoid NSAIDs such as (Ibuprofen, Aleve, Naproxen, Advil)?   Yes       ROS:  GI:      10/11/2017   Vomiting: Yes   Diarrhea: Yes   Constipation: No   Swallowing trouble: Yes   Abdominal pain: Yes   Heartburn: Yes   Rash in skin folds: Yes   Depression: Yes   Stress urinary incontinence No     Skin:   BAR RBS ROS - SKIN 10/11/2017   Rash in skin folds:  Yes     Psych:      10/11/2017   Depression: Yes   Anxiety: Yes     Female Only:   BAR RBS ROS - FEMALE ONLY 10/11/2017   Female only: Loss of menstrual cycles       LABS/IMAGING/MEDICAL RECORDS REVIEW:   Surgical pathology (9/27/2017):   FINAL DIAGNOSIS:   ESOPHAGUS, Z LINE, BIOPSY:   - Gastric cardiac-type mucosa with mild chronic inflammation   - No H. Pylori like organisms identified in routine staining   - Negative for intestinal metaplasia or dysplasia   Bety Wyatt M.D., PhD, [facility]     EGD (9/27/2017):                                                                            Findings:        Deep ulcer without visible vessel just proximal to the efferent limb. No        stenosis. Small hiatal hernia with LA A esophagitis. bx x 1 at the GE        junction Z line. Pouch 30 ml.                                                                                     Impression:          - No specimens collected.                        - Ulcer-recurrent-marginal; must stop smoking       PHYSICAL EXAMINATION:  General - tearful but in NAD  Pulmonary - breathing comfortably on RA  Abdominal - Soft, non-distended, mildly tender to palpation  Neurologic - alert, oriented, moving all extremities spontaneously       ASSESSMENT AND PLAN:    Ms Savanah Payton is a 51 yo female s/p RYGB in 2006 ([facility] [place] and laparoscopic revision for marginal ulcers in 2011, presenting with painful ulcer. Plan is for RYGB reversal after patient stops smoking.  1. EGD in 1 mo to evaluate if ulcer is healing.  2. Patient is to stop smoking. She is going to start lozenge program.  3. Follow up in clinic in 1 mo and discuss RYGB reversal then.    Sincerely,    Jennifer Bermudez, MS3    Staff Physician Comments:  The medical student acted as scribe and the encounter documented above was performed by myself and accurately depicts my evaluation, diagnoses, decisions, treatment, and follow-up plans. Any procedures documented in the  student's note was performed by myself with the assistance of the medical student.    Mango Gonzalez MD    I spent a total of 1o minutes face to face with Savanah during today's office visit. Over 50% of this time was spent counseling the patient and/or coordinating care.

## 2017-10-11 NOTE — LETTER
Date:October 12, 2017      Patient was self referred, no letter generated. Do not send.        [facility] Health Information

## 2017-10-11 NOTE — MR AVS SNAPSHOT
After Visit Summary   10/11/2017    Savanah Payton    MRN: [id number]           Patient Information     Date Of Birth          1965        Visit Information        Provider Department      10/11/2017 1:45 PM Mango Gonzalez MD [facility]        Today's Diagnoses     Marginal ulcer    -  1       Follow-ups after your visit        Your next 10 appointments already scheduled     Nov 15, 2017  3:30 PM CST   (Arrive by 3:15 PM)   RETURN BARIATRIC SURGERY with MD JERRY Ansari [facility] Surgical Weight Management ([facility])    [address] [place] [id number] [phone]              Who to contact     Please call your clinic at [phone] to:    Ask questions about your health    Make or cancel appointments    Discuss your medicines    Learn about your test results    Speak to your doctor   If you have compliments or concerns about an experience at your clinic, or if you wish to file a complaint, please contact [facility] Patient Relations at [phone] or email us at [email or web address]         Additional Information About Your Visit        MyChart Information     [employer] gives you secure access to your electronic health record. If you see a primary care provider, you can also send messages to your care team and make appointments. If you have questions, please call your primary care clinic.  If you do not have a primary care provider, please call [phone] and they will assist you.      [employer] is an electronic gateway that provides easy, online access to your medical records. With Valent[employer], you can request a clinic appointment, read your test results, renew a prescription or communicate with your care team.     To access your existing account, please contact your [facility] or call [phone] for assistance.        Care EveryWhere ID      This is your Care EveryWhere ID. This could be used by other organizations to access your [place] medical records  [id number]         Blood Pressure from Last 3 Encounters:   09/26/17 107/65   08/30/17 123/56    Weight from Last 3 Encounters:   08/30/17 202 lb 9.6 oz              Today, you had the following     No orders found for display       Primary Care Provider    None Specified       No primary provider on file.        Equal Access to Services     VARSHA HARKINS : Hadii aad ku pedroo Sojm, waaxda luqadaha, qaybta kaalmada tena, waxay idiin haysaadjanessa feirrolenorelayo beavers . So [facility] [phone].    ATENCIÓN: Si habla espmichelle, tiene a kearns disposición servicios gratuitos de asistencia lingüística. Llame al [phone].    We comply with applicable federal civil rights laws and [place] laws. We do not discriminate on the basis of race, color, national origin, age, disability, sex, sexual orientation, or gender identity.            Thank you!     Thank you for choosing [facility] SURGICAL WEIGHT MANAGEMENT  for your care. Our goal is always to provide you with excellent care. Hearing back from our patients is one way we can continue to improve our services. Please take a few minutes to complete the written survey that you may receive in the mail after your visit with us. Thank you!             Your Updated Medication List - Protect others around you: Learn how to safely use, store and throw away your medicines at www.disposemymeds.org.          This list is accurate as of: 10/11/17  5:32 PM.  Always use your most recent med list.                   Brand Name Dispense Instructions for use Diagnosis    BUSPAR PO      Take 30 mg by mouth 2 times daily        calcium carb 1250 mg (500 mg [place])/vitamin D 200 units 500-200 MG-UNIT per tablet    OSCAL with D     Take 1 tablet by mouth 2 times daily (with meals)        GABAPENTIN PO      Take 800 mg by mouth 4 times daily        LEVOTHYROXINE SODIUM PO       Take 137 mcg by mouth daily        LISINOPRIL PO      Take 10 mg by mouth daily        MAGNESIUM OXIDE PO      Take 500 mg by mouth daily        PROMETHAZINE HCL RE      Place 25 mg rectally daily        RANITIDINE HCL PO      Take 150 mg by mouth 2 times daily        sucralfate 1 GM tablet    CARAFATE     Take by mouth 4 times daily        TOPIRAMATE PO      Take 100 mg by mouth 3 times daily        VITAMIN B-12 PO      Take 2,500 mcg by mouth daily

## 2017-11-15 ENCOUNTER — OFFICE VISIT (OUTPATIENT)
Dept: SURGERY | Facility: CLINIC | Age: 52
End: 2017-11-15

## 2017-11-15 VITALS
BODY MASS INDEX: 32.34 KG/M2 | TEMPERATURE: 98.3 F | OXYGEN SATURATION: 98 % | SYSTOLIC BLOOD PRESSURE: 107 MMHG | HEIGHT: 65 IN | HEART RATE: 58 BPM | DIASTOLIC BLOOD PRESSURE: 61 MMHG | WEIGHT: 194.1 LBS

## 2017-11-15 DIAGNOSIS — Z87.898 HX OF ULCER DISEASE: Primary | ICD-10-CM

## 2017-11-15 ASSESSMENT — PAIN SCALES - GENERAL: PAINLEVEL: MODERATE PAIN (4)

## 2017-11-15 NOTE — PROGRESS NOTES
I had the pleasure of seeing your patient, Savanah Payton, in my post-bariatric assessment clinic.    As you know, she was morbidly obese and underwent gerard en y laparoscopic surgery to treat obesity in association with her medical conditions of obesity in 2006. This was revised in 2010. She has had persistent abdominal pain and a known marginal ulcer. Underwent upper endoscopy on 9/26/17 with Dr. Gonzalez with notable persistent ulcer. She was instructed to discontinue smoking.     She has quit smoking for two weeks without medical assist. She takes 1-2 20mg omeprazole daily depending on the amount of LUQ abdominal pain she is experiencing. She doesn't take them twice a day every day because she gets diarrhea.  Feels that her pain hasn't changed much since quitting. Has had issues with staying hydrated. Denies blood per rectum or melanic stools. Doesn't feel that certain foods make the pain worse or better. Not on steroids or taking anticoagulant medication.    Most recent weights:  Wt Readings from Last 4 Encounters:   11/15/17 88 kg (194 lb 1.6 oz)   08/30/17 91.9 kg (202 lb 9.6 oz)       Currently, her Body mass index is 32.8 kg/(m^2).    ACTIVE MEDICAL PROBLEMS  There is no problem list on file for this patient.     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   NAD NCAT  Respiratory: breathing unlabored  Abdomen: s/nt/nd; scars from prior laparoscopic surgery    Tolerating regular texture foods: yes      Orders Placed This Encounter   Procedures     Brandie-Operative Endoscopy Worksheet - EGD - Bariatrics       PLAN  1. Continue smoking cessation  2. Plan for repeat upper endoscopy in 3-4 weeks to evaluate for healing of known marginal ulcer. If continues to have medically recalcitrant ulcer, will discuss further plans for gerard en y reversal.    If you have any questions about our plans, please don't hesitate to contact me.     Mango Gonzalez MD  Surgery  [phone] (hospital )  [phone] (clinic nurses)  "

## 2017-11-15 NOTE — MR AVS SNAPSHOT
After Visit Summary   11/15/2017    Savanah Payton    MRN: [id number]           Patient Information     Date Of Birth          1965        Visit Information        Provider Department      11/15/2017 3:30 PM Mango Gonzalez MD [facility] Surgical Weight Management        Today's Diagnoses     Hx of ulcer disease    -  1      Care Instructions    It was a pleasure meeting with you today.     Thank you for allowing us the privilege of caring for you. We hope we provided you with the excellent service you deserve.     Please let us know if there is anything else we can do for you so that we can be sure you are leaving completely satisfied with your care experience.      You saw Dr Gonzalez today.     Instructions per today's visit:     Please continue with smoking cessation.     Please have Endoscopy in 4-6 weeks with Dr. Gonzalez.     To schedule your endoscopy with Dr Gonzalez, call Jorge at [phone].  Date:__________  Time:__________    Endoscopy Instructions:  -Have nothing to eat or drink for 6 hours prior to the check-in time.   -Check-in 1 hour before the procedure time.  -Please bring a  to escort you home after the procedure.  -You cannot drive for 24 hours after the procedure due to the sedation.  -The Endoscopy Department is located on the 1st floor of the [facility].  -The [facility] is at [address] [place] [address].    -Ph: [phone] for directions or nursing questions.  -[facility] parking is available for the same price as parking in the [place] ramp.      To schedule appointments with our team, please call [phone] option #1    Please call during clinic hours Monday through Friday 8:00a - 4:00p if you have questions or you can contact us via [employer] at anytime.      Nurses: [phone] Option # 3 for nurse advice line.  Fax: [phone]  Surgery Scheduler: [phone]    Please call the  "hospital at [phone] to speak with our on call MDs if you have urgent needs after hours, during weekends, or holidays.              Follow-ups after your visit        Who to contact     Please call your clinic at [phone] to:    Ask questions about your health    Make or cancel appointments    Discuss your medicines    Learn about your test results    Speak to your doctor   If you have compliments or concerns about an experience at your clinic, or if you wish to file a complaint, please contact [facility] Patient Relations at [phone] or email us at [email or web address]         Additional Information About Your Visit        [email or web address]" Information     [employer] gives you secure access to your electronic health record. If you see a primary care provider, you can also send messages to your care team and make appointments. If you have questions, please call your primary care clinic.  If you do not have a primary care provider, please call [phone] and they will assist you.      [employer] is an electronic gateway that provides easy, online access to your medical records. With [employer], you can request a clinic appointment, read your test results, renew a prescription or communicate with your care team.     To access your existing account, please contact your [facility] Clinic or call [phone] for assistance.        Care EveryWhere ID     This is your Care EveryWhere ID. This could be used by other organizations to access your [place] medical records  [id number]        Your Vitals Were     Pulse Temperature Height Pulse Oximetry BMI (Body Mass Index)       58 98.3  F (36.8  C) (Oral) 1.638 m (5' 4.5\") 98% 32.8 kg/m2        Blood Pressure from Last 3 Encounters:   11/15/17 107/61   09/26/17 107/65   08/30/17 123/56    Weight from Last 3 Encounters:   11/15/17 88 kg (194 lb 1.6 oz)   08/30/17 91.9 kg (202 lb 9.6 oz)              We Performed the " Following     Brandie-Operative Endoscopy Worksheet - EGD - Bariatrics        Primary Care Provider    None Specified       No primary provider on file.        Equal Access to Services     VARSHA HARKINS : Hadii aad ku haddesmondjs Nguyễn, sandytaye loeradorisha, daviddanish blockminotaye madsen, david maradiaga hayalyssa greeneguzman srilenorelayo león. So [facility] [phone].    ATENCIÓN: Si habla español, tiene a kearns disposición servicios gratuitos de asistencia lingüística. Llame al [phone].    We comply with applicable federal civil rights laws and [place] laws. We do not discriminate on the basis of race, color, national origin, age, disability, sex, sexual orientation, or gender identity.            Thank you!     Thank you for choosing [facility] SURGICAL WEIGHT MANAGEMENT  for your care. Our goal is always to provide you with excellent care. Hearing back from our patients is one way we can continue to improve our services. Please take a few minutes to complete the written survey that you may receive in the mail after your visit with us. Thank you!             Your Updated Medication List - Protect others around you: Learn how to safely use, store and throw away your medicines at www.disposemymeds.org.          This list is accurate as of: 11/15/17  3:35 PM.  Always use your most recent med list.                   Brand Name Dispense Instructions for use Diagnosis    BUSPAR PO      Take 30 mg by mouth 2 times daily        Calcium carb-Vitamin D 500 mg [place]-200 units 500-200 MG-UNIT per tablet    OSCAL with D;Oyster Shell Calcium     Take 1 tablet by mouth 2 times daily (with meals)        GABAPENTIN PO      Take 800 mg by mouth 4 times daily        LEVOTHYROXINE SODIUM PO      Take 137 mcg by mouth daily        LISINOPRIL PO      Take 10 mg by mouth daily        MAGNESIUM OXIDE PO      Take 500 mg by mouth daily        PROMETHAZINE HCL RE      Place 25 mg rectally daily        RANITIDINE HCL PO      Take 150 mg by mouth 2 times daily         sucralfate 1 GM tablet    CARAFATE     Take by mouth 4 times daily        TOPIRAMATE PO      Take 100 mg by mouth 3 times daily        VITAMIN B-12 PO      Take 2,500 mcg by mouth daily

## 2017-11-15 NOTE — LETTER
11/15/2017       RE: Savanah Payton  [address] [facility]   [place] [address]     Dear Colleague,    Thank you for referring your patient, Savanah Payton, to the [facility] SURGICAL WEIGHT MANAGEMENT at [facility]. Please see a copy of my visit note below.    I had the pleasure of seeing your patient, Savanah Payton, in my post-bariatric assessment clinic.    As you know, she was morbidly obese and underwent gerard en y laparoscopic surgery to treat obesity in association with her medical conditions of obesity in 2006. This was revised in 2010. She has had persistent abdominal pain and a known marginal ulcer. Underwent upper endoscopy on 9/26/17 with Dr. Gonzalez with notable persistent ulcer. She was instructed to discontinue smoking.     She has quit smoking for two weeks without medical assist. She takes 1-2 20mg omeprazole daily depending on the amount of LUQ abdominal pain she is experiencing. She doesn't take them twice a day every day because she gets diarrhea.  Feels that her pain hasn't changed much since quitting. Has had issues with staying hydrated. Denies blood per rectum or melanic stools. Doesn't feel that certain foods make the pain worse or better. Not on steroids or taking anticoagulant medication.    Most recent weights:  Wt Readings from Last 4 Encounters:   11/15/17 88 kg (194 lb 1.6 oz)   08/30/17 91.9 kg (202 lb 9.6 oz)       Currently, her Body mass index is 32.8 kg/(m^2).    ACTIVE MEDICAL PROBLEMS  There is no problem list on file for this patient.     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   NAD NCAT  Respiratory: breathing unlabored  Abdomen: s/nt/nd; scars from prior laparoscopic surgery    Tolerating regular texture foods: yes      Orders Placed This Encounter   Procedures     Brandie-Operative Endoscopy Worksheet - EGD - Bariatrics       PLAN  1. Continue smoking cessation  2. Plan for repeat upper endoscopy in 3-4 weeks to evaluate for healing of known marginal ulcer. If continues to have medically recalcitrant ulcer, will discuss further plans for gerard en y reversal.    If you have any questions about our plans, please don't hesitate to contact me.     Mango Gonzalez MD  Surgery  [phone] (hospital )  [phone] (clinic nurses)    Again, thank you for allowing me to participate in the care of your patient.      Sincerely,    Mangoilsa Gonzalez MD      "

## 2017-11-15 NOTE — LETTER
Date:November 16, 2017      Patient was self referred, no letter generated. Do not send.        [facility] Phys[facility] Health Information

## 2017-11-15 NOTE — NURSING NOTE
"Chief Complaint   Patient presents with     RECHECK     Follow from 10/11/17       Vitals:    11/15/17 1507   BP: 107/61   Pulse: 58   Temp: 98.3  F (36.8  C)   TempSrc: Oral   SpO2: 98%   Weight: 194 lb 1.6 oz   Height: 5' 4.5\"       Body mass index is 32.8 kg/(m^2).      Chandrakant Everett CMA                    "

## 2017-11-15 NOTE — PATIENT INSTRUCTIONS
It was a pleasure meeting with you today.     Thank you for allowing us the privilege of caring for you. We hope we provided you with the excellent service you deserve.     Please let us know if there is anything else we can do for you so that we can be sure you are leaving completely satisfied with your care experience.      You saw Dr Gonzalez today.     Instructions per today's visit:     Please continue with smoking cessation.     Please have Endoscopy in 4-6 weeks with Dr. Gonzalez.     To schedule your endoscopy with Dr Gonzalez, call Jorge at [phone].  Date:__________  Time:__________    Endoscopy Instructions:  -Have nothing to eat or drink for 6 hours prior to the check-in time.   -Check-in 1 hour before the procedure time.  -Please bring a  to escort you home after the procedure.  -You cannot drive for 24 hours after the procedure due to the sedation.  -The Endoscopy Department is located on the 1st floor of the [facility].  -The [facility] is at [address] [place] [address].    -Ph: [phone] for directions or nursing questions.  -[facility] parking is available for the same price as parking in the [employer] ramp.      To schedule appointments with our team, please call [phone] option #1    Please call during clinic hours Monday through Friday 8:00a - 4:00p if you have questions or you can contact us via [employer] at anytime.      Nurses: [phone] Option # 3 for nurse advice line.  Fax: [phone]  Surgery Scheduler: [phone]    Please call the hospital at [phone] to speak with our on call MDs if you have urgent needs after hours, during weekends, or holidays.

## 2017-12-04 ENCOUNTER — TELEPHONE (OUTPATIENT)
Dept: GASTROENTEROLOGY | Facility: CLINIC | Age: 52
End: 2017-12-04

## 2017-12-04 NOTE — TELEPHONE ENCOUNTER
Patient scheduled for EGD    Indication for procedure. Hx of ulcer disease    Referring Provider. Mango Gonzalez MD    ? Not Needed    Arrival time verified? Yes, 0800    Facility location verified? Yes, [address]    Instructions given regarding prep and procedure    Prep Type NPO    Are you taking any anticoagulants or blood thinners? No    Instructions given? N/A    Electronic implanted devices? No    Pre procedure teaching completed? Yes    Transportation from procedure?     H&P / Pre op physical completed? N/A

## 2017-12-12 ENCOUNTER — HOSPITAL ENCOUNTER (OUTPATIENT)
Facility: CLINIC | Age: 52
Discharge: HOME | End: 2017-12-12
Attending: SURGERY | Admitting: SURGERY
Payer: MEDICARE

## 2017-12-12 VITALS
HEART RATE: 61 BPM | OXYGEN SATURATION: 99 % | DIASTOLIC BLOOD PRESSURE: 105 MMHG | RESPIRATION RATE: 15 BRPM | SYSTOLIC BLOOD PRESSURE: 145 MMHG

## 2017-12-12 LAB — UPPER GI ENDOSCOPY: NORMAL

## 2017-12-12 PROCEDURE — 25000128 H RX IP 250 OP 636: Performed by: SURGERY

## 2017-12-12 PROCEDURE — 25000132 ZZH RX MED GY IP 250 OP 250 PS 637: Mod: GY | Performed by: SURGERY

## 2017-12-12 PROCEDURE — 43235 EGD DIAGNOSTIC BRUSH WASH: CPT | Performed by: SURGERY

## 2017-12-12 PROCEDURE — 40000104 ZZH STATISTIC MODERATE SEDATION < 10 MIN: Performed by: SURGERY

## 2017-12-12 PROCEDURE — 25000125 ZZHC RX 250: Performed by: SURGERY

## 2017-12-12 RX ORDER — SIMETHICONE
LIQUID (ML) MISCELLANEOUS PRN
Status: DISCONTINUED | OUTPATIENT
Start: 2017-12-12 | End: 2017-12-12 | Stop reason: HOSPADM

## 2017-12-12 RX ORDER — FENTANYL CITRATE 50 UG/ML
INJECTION, SOLUTION INTRAMUSCULAR; INTRAVENOUS PRN
Status: DISCONTINUED | OUTPATIENT
Start: 2017-12-12 | End: 2017-12-12 | Stop reason: HOSPADM

## 2017-12-12 RX ORDER — ONDANSETRON 2 MG/ML
4 INJECTION INTRAMUSCULAR; INTRAVENOUS
Status: DISCONTINUED | OUTPATIENT
Start: 2017-12-12 | End: 2017-12-12 | Stop reason: HOSPADM

## 2017-12-12 NOTE — IP AVS SNAPSHOT
[facility], [place], Endoscopy    [address] [facility] [address]    Phone:  [phone]                                       After Visit Summary   12/12/2017    Savanah Payton    MRN: [id number]           After Visit Summary Signature Page     I have received my discharge instructions, and my questions have been answered. I have discussed any challenges I see with this plan with the nurse or doctor.    ..........................................................................................................................................  Patient/Patient Representative Signature      ..........................................................................................................................................  Patient Representative Print Name and Relationship to Patient    ..................................................               ................................................  Date                                            Time    ..........................................................................................................................................  Reviewed by Signature/Title    ...................................................              ..............................................  Date                                                            Time

## 2017-12-12 NOTE — DISCHARGE INSTRUCTIONS
Discharge Instructions after  Upper Endoscopy (EGD) by Dr Gonzalez    Activity and Diet  You were given medicine for pain. You may be dizzy or sleepy.  For 24 hours:    Do not drive or use heavy equipment.    Do not make important decisions.    Do not drink any alcohol.  _X__ You may return to your regular diet.    Discomfort  You may have a sore throat for 2 to 3 days. It may help to:    Avoid hot liquids for 24 hours.    Use sore throat lozenges.    Gargle as needed with salt water up to 4 times a day. Mix 1 cup of warm water  with 1 teaspoon of salt. Do not swallow.  You may take Tylenol (acetaminophen) for pain unless your doctor has told you not to.    Follow-up  _X__ As indicated by the physician    When to call us:  Problems are rare. Call right away if you have:    Unusual throat pain or trouble swallowing    Unusual pain in belly or chest that is not relieved by belching or passing air    Black stools (tar-like looking bowel movement)    Temperature above 100.6  F. (37.5  C).    If you vomit blood or have severe pain, go to an emergency room.    If you have questions, call:  Monday to Friday, 7 a.m. to 4:30 p.m.: Endoscopy: [phone] (We may have to call you back)    After hours: Hospital: [phone] (Ask for the GI fellow on call)

## 2017-12-12 NOTE — IP AVS SNAPSHOT
MRN:[id number]                      After Visit Summary   12/12/2017    Savanah Payton    MRN: [id number]           Thank you!     Thank you for choosing [facility] for your care. Our goal is always to provide you with excellent care. Hearing back from our patients is one way we can continue to improve our services. Please take a few minutes to complete the written survey that you may receive in the mail after you visit with us. Thank you!        Patient Information     Date Of Birth          1965        About your hospital stay     You were admitted on:  December 12, 2017 You last received care in the:  [facility], Endoscopy    You were discharged on:  December 12, 2017       Who to Call     For medical emergencies, please call 911.  For non-urgent questions about your medical care, please call your primary care provider or clinic, None  For questions related to your surgery, please call your surgery clinic        Attending Provider     Provider Mango Webb MD General Surgery       Primary Care Provider    None Specified      Further instructions from your care team       Discharge Instructions after  Upper Endoscopy (EGD) by Dr Gonzalez    Activity and Diet  You were given medicine for pain. You may be dizzy or sleepy.  For 24 hours:    Do not drive or use heavy equipment.    Do not make important decisions.    Do not drink any alcohol.  _X__ You may return to your regular diet.    Discomfort  You may have a sore throat for 2 to 3 days. It may help to:    Avoid hot liquids for 24 hours.    Use sore throat lozenges.    Gargle as needed with salt water up to 4 times a day. Mix 1 cup of warm water  with 1 teaspoon of salt. Do not swallow.  You may take Tylenol (acetaminophen) for pain unless your doctor has told you not to.    Follow-up  _X__ As indicated by the physician    When to call us:  Problems are rare. Call right away if you have:    Unusual throat pain or  trouble swallowing    Unusual pain in belly or chest that is not relieved by belching or passing air    Black stools (tar-like looking bowel movement)    Temperature above 100.6  F. (37.5  C).    If you vomit blood or have severe pain, go to an emergency room.    If you have questions, call:  Monday to Friday, 7 a.m. to 4:30 p.m.: Endoscopy: [phone] (We may have to call you back)    After hours: Hospital: [phone] (Ask for the GI fellow on call)    Pending Results     No orders found from 12/10/2017 to 12/13/2017.            Admission Information     Date & Time Provider Department Dept. Phone    12/12/2017 Mango Gonzalez MD [facility], [place], Endoscopy [phone]      Your Vitals Were     Blood Pressure Pulse Respirations Pulse Oximetry          135/67 61 12 97%        MyChart Information     [employer] gives you secure access to your electronic health record. If you see a primary care provider, you can also send messages to your care team and make appointments. If you have questions, please call your primary care clinic.  If you do not have a primary care provider, please call [phone] and they will assist you.        Care EveryWhere ID     This is your Care EveryWhere ID. This could be used by other organizations to access your [place] medical records  [id number]        Equal Access to Services     VARSHA HARKINS : Hadii aad ku hadasho Socindaali, waaxda luqadaha, qaybta kaalmada adeguzmanyada, david león. So [facility] [phone].    ATENCIÓN: Si habla español, tiene a kearns disposición servicios gratuitos de asistencia lingüística. Llame al [phone].    We comply with applicable federal civil rights laws and [place] laws. We do not discriminate on the basis of race, color, national origin, age, disability, sex, sexual orientation, or gender identity.               Review of your medicines      CONTINUE these medicines which have NOT CHANGED        Dose / Directions     BUSPAR PO        Dose:  30 mg   Take 30 mg by mouth 2 times daily   Refills:  0       Calcium carb-Vitamin D 500 mg [place]-200 units 500-200 MG-UNIT per tablet   Commonly known as:  OSCAL with D;Oyster Shell Calcium        Dose:  1 tablet   Take 1 tablet by mouth 2 times daily (with meals)   Refills:  0       GABAPENTIN PO        Dose:  800 mg   Take 800 mg by mouth 4 times daily   Refills:  0       LEVOTHYROXINE SODIUM PO        Dose:  137 mcg   Take 137 mcg by mouth daily   Refills:  0       LISINOPRIL PO        Dose:  10 mg   Take 10 mg by mouth daily   Refills:  0       MAGNESIUM OXIDE PO        Dose:  500 mg   Take 500 mg by mouth daily   Refills:  0       PROMETHAZINE HCL RE        Dose:  25 mg   Place 25 mg rectally daily   Refills:  0       RANITIDINE HCL PO        Dose:  150 mg   Take 150 mg by mouth 2 times daily   Refills:  0       sucralfate 1 GM tablet   Commonly known as:  CARAFATE        Take by mouth 4 times daily   Refills:  0       TOPIRAMATE PO        Dose:  100 mg   Take 100 mg by mouth 3 times daily   Refills:  0       VITAMIN B-12 PO        Dose:  2500 mcg   Take 2,500 mcg by mouth daily   Refills:  0                Protect others around you: Learn how to safely use, store and throw away your medicines at www.disposemymeds.org.             Medication List: This is a list of all your medications and when to take them. Check marks below indicate your daily home schedule. Keep this list as a reference.      Medications           Morning Afternoon Evening Bedtime As Needed    BUSPAR PO   Take 30 mg by mouth 2 times daily                                Calcium carb-Vitamin D 500 mg [place]-200 units 500-200 MG-UNIT per tablet   Commonly known as:  OSCAL with D;Oyster Shell Calcium   Take 1 tablet by mouth 2 times daily (with meals)                                GABAPENTIN PO   Take 800 mg by mouth 4 times daily                                LEVOTHYROXINE SODIUM PO   Take 137 mcg by mouth daily                                 LISINOPRIL PO   Take 10 mg by mouth daily                                MAGNESIUM OXIDE PO   Take 500 mg by mouth daily                                PROMETHAZINE HCL RE   Place 25 mg rectally daily                                RANITIDINE HCL PO   Take 150 mg by mouth 2 times daily                                sucralfate 1 GM tablet   Commonly known as:  CARAFATE   Take by mouth 4 times daily                                TOPIRAMATE PO   Take 100 mg by mouth 3 times daily                                VITAMIN B-12 PO   Take 2,500 mcg by mouth daily

## 2017-12-15 ENCOUNTER — CARE COORDINATION (OUTPATIENT)
Dept: SURGERY | Facility: CLINIC | Age: 52
End: 2017-12-15

## 2017-12-15 DIAGNOSIS — Z98.84 BARIATRIC SURGERY STATUS: Primary | ICD-10-CM

## 2017-12-15 DIAGNOSIS — Z01.818 PREOP EXAMINATION: ICD-10-CM

## 2017-12-15 DIAGNOSIS — E46 PROTEIN-CALORIE MALNUTRITION, UNSPECIFIED SEVERITY (H): ICD-10-CM

## 2018-01-17 ENCOUNTER — OFFICE VISIT (OUTPATIENT)
Dept: SURGERY | Facility: CLINIC | Age: 53
End: 2018-01-17
Payer: COMMERCIAL

## 2018-01-17 VITALS
OXYGEN SATURATION: 100 % | WEIGHT: 192.6 LBS | SYSTOLIC BLOOD PRESSURE: 134 MMHG | HEART RATE: 61 BPM | DIASTOLIC BLOOD PRESSURE: 74 MMHG | BODY MASS INDEX: 32.09 KG/M2 | TEMPERATURE: 98.4 F | HEIGHT: 65 IN

## 2018-01-17 DIAGNOSIS — Z87.898 HX OF ULCER DISEASE: Primary | ICD-10-CM

## 2018-01-17 NOTE — MR AVS SNAPSHOT
After Visit Summary   1/17/2018    Savanah Payton    MRN: [id number]           Patient Information     Date Of Birth          1965        Visit Information        Provider Department      1/17/2018 3:30 PM Mango Gonzalez MD [facility] Surgical Weight Management        Today's Diagnoses     Hx of ulcer disease    -  1       Follow-ups after your visit        Your next 10 appointments already scheduled     Jan 29, 2018   Procedure with Mango Gonzalez MD   [facility], [place], Same Day Surgery (--)    [address]  [place] [id number] [phone]              Who to contact     Please call your clinic at [phone] to:    Ask questions about your health    Make or cancel appointments    Discuss your medicines    Learn about your test results    Speak to your doctor   If you have compliments or concerns about an experience at your clinic, or if you wish to file a complaint, please contact [facility] Patient Relations at [phone] or email us at [email or web address]         Additional Information About Your Visit        [employer] Information     [employer] gives you secure access to your electronic health record. If you see a primary care provider, you can also send messages to your care team and make appointments. If you have questions, please call your primary care clinic.  If you do not have a primary care provider, please call [phone] and they will assist you.      [employer] is an electronic gateway that provides easy, online access to your medical records. With [employer], you can request a clinic appointment, read your test results, renew a prescription or communicate with your care team.     To access your existing account, please contact your [facility] Clinic or call [phone] for assistance.        Care EveryWhere ID     This is your Care EveryWhere ID. This could be used by other organizations to access your [place]  "medical records  [id number]        Your Vitals Were     Pulse Temperature Height Pulse Oximetry BMI (Body Mass Index)       61 98.4  F (36.9  C) (Oral) 5' 4.5\" 100% 32.55 kg/m2        Blood Pressure from Last 3 Encounters:   01/17/18 134/74   12/12/17 (!) 145/105   11/15/17 107/61    Weight from Last 3 Encounters:   01/17/18 192 lb 9.6 oz   11/15/17 194 lb 1.6 oz   08/30/17 202 lb 9.6 oz              Today, you had the following     No orders found for display       Primary Care Provider Fax #    Provider Not In System [phone]                Equal Access to Services     VARSHA HARKINS : Alfred Nguyễn, johnson key, joya madsen, david beavers . So [facility] [phone].    ATENCIÓN: Si habla español, tiene a kearns disposición servicios gratuitos de asistencia lingüística. Llame al [phone].    We comply with applicable federal civil rights laws and [place] laws. We do not discriminate on the basis of race, color, national origin, age, disability, sex, sexual orientation, or gender identity.            Thank you!     Thank you for choosing [facility] SURGICAL WEIGHT MANAGEMENT  for your care. Our goal is always to provide you with excellent care. Hearing back from our patients is one way we can continue to improve our services. Please take a few minutes to complete the written survey that you may receive in the mail after your visit with us. Thank you!             Your Updated Medication List - Protect others around you: Learn how to safely use, store and throw away your medicines at www.disposemymeds.org.          This list is accurate as of: 1/17/18  5:10 PM.  Always use your most recent med list.                   Brand Name Dispense Instructions for use Diagnosis    BUSPAR PO      Take 30 mg by mouth 2 times daily        Calcium carb-Vitamin D 500 mg [place]-200 units 500-200 MG-UNIT per tablet    OSCAL with D;Oyster Shell Calcium     Take 1 tablet by " mouth 2 times daily (with meals)        GABAPENTIN PO      Take 800 mg by mouth 4 times daily        LEVOTHYROXINE SODIUM PO      Take 137 mcg by mouth daily        LISINOPRIL PO      Take 10 mg by mouth daily        MAGNESIUM OXIDE PO      Take 500 mg by mouth daily        PROMETHAZINE HCL RE      Place 25 mg rectally daily        RANITIDINE HCL PO      Take 150 mg by mouth 2 times daily        sucralfate 1 GM tablet    CARAFATE     Take by mouth 4 times daily        TOPIRAMATE PO      Take 100 mg by mouth 3 times daily        VITAMIN B-12 PO      Take 2,500 mcg by mouth daily

## 2018-01-17 NOTE — LETTER
Date:January 18, 2018      Patient was self referred, no letter generated. Do not send.        [facility] Health Information

## 2018-01-17 NOTE — PROGRESS NOTES
Patient here with her spouse to discuss the reversal procedure. 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 Her most recent procedure was in [facility] performed September 24, 2012 at that time she was diagnosed with a nonhealing marginal ulcer and underwent a laparoscopic revision of her gastrojejunostomy. As best as we can tell she has a remnant stomach left in place in order to facilitate this. The plan is for reversal with robotic assistance. The patient understands that we may need to start this laparoscopically and convert to open. She understands the potential risk of reflux, leak, re-ulceration, impaired gastric emptying. She also understands that she may have a variable hospital stay between 2 days or considerably more than that. She understands we may need to leave a nasogastric tube whether intervention such as splenectomy. They are quite satisfied with the plan as laid out to reverse her surgery and the fact that we may remove her Aaron limb depending on the length of it. She also understands that there may be potential for bleeding. She also understands that if the anatomy is not favorable and that we have to go high on her esophagus or if there is insufficient stomach that we may need to in fact resect the ulcer and do an esophagojejunostomy.

## 2018-01-17 NOTE — LETTER
1/17/2018       RE: Savanah Payton  [address]   [place] [address]     Dear Colleague,    Thank you for referring your patient, Savanah Payton, to the [facility] SURGICAL WEIGHT MANAGEMENT at [facility]. Please see a copy of my visit note below.    Patient here with her spouse to discuss the reversal procedure. 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 Her most recent procedure was in [facility] performed September 24, 2012 at that time she was diagnosed with a nonhealing marginal ulcer and underwent a laparoscopic revision of her gastrojejunostomy. As best as we can tell she has a remnant stomach left in place in order to facilitate this. The plan is for reversal with robotic assistance. The patient understands that we may need to start this laparoscopically and convert to open. She understands the potential risk of reflux, leak, re-ulceration, impaired gastric emptying. She also understands that she may have a variable hospital stay between 2 days or considerably more than that. She understands we may need to leave a nasogastric tube whether intervention such as splenectomy. They are quite satisfied with the plan as laid out to reverse her surgery and the fact that we may remove her Aaron limb depending on the length of it. She also understands that there may be potential for bleeding. She also understands that if the anatomy is not favorable and that we have to go high on her esophagus or if there is insufficient stomach that we may need to in fact resect the ulcer and do an esophagojejunostomy.    Again, thank you for allowing me to participate in the care of your patient.      Sincerely,    Mango Gonzalez MD

## 2018-01-17 NOTE — NURSING NOTE
"Chief Complaint   Patient presents with     RECHECK     Discuss upcoming reversal       Vitals:    01/17/18 1531   BP: 134/74   Pulse: 61   Temp: 98.4  F (36.9  C)   TempSrc: Oral   SpO2: 100%   Weight: 192 lb 9.6 oz   Height: 5' 4.5\"       Body mass index is 32.55 kg/(m^2).    Chandrakant Everett CMA                       "

## 2018-01-18 ENCOUNTER — CARE COORDINATION (OUTPATIENT)
Dept: SURGERY | Facility: CLINIC | Age: 53
End: 2018-01-18

## 2018-01-18 NOTE — PROGRESS NOTES
Called patient regarding bowel prep. Patient will need 2 days clear liquids with Mag citrate on day one of prep. Verbally reviewed bowel prep with patient. Mailed out packet to patient. She will call with any questions.

## 2018-01-21 ENCOUNTER — HEALTH MAINTENANCE LETTER (OUTPATIENT)
Age: 53
End: 2018-01-21

## 2018-01-26 ENCOUNTER — ANESTHESIA EVENT (OUTPATIENT)
Dept: SURGERY | Facility: CLINIC | Age: 53
DRG: 327 | End: 2018-01-26
Payer: MEDICARE

## 2018-01-28 ASSESSMENT — LIFESTYLE VARIABLES: TOBACCO_USE: 1

## 2018-01-28 ASSESSMENT — ENCOUNTER SYMPTOMS: SEIZURES: 1

## 2018-01-28 NOTE — PROGRESS NOTES
Plan for reversal of her gastric bypass tomorrow.  Will likely not need the robot to accomplish a laparoscopic reversal.  Patient understands the procedure and its risks and is ready to proceed.

## 2018-01-29 ENCOUNTER — HOSPITAL ENCOUNTER (INPATIENT)
Facility: CLINIC | Age: 53
LOS: 3 days | Discharge: HOME | DRG: 327 | End: 2018-02-01
Attending: SURGERY | Admitting: SURGERY
Payer: MEDICARE

## 2018-01-29 ENCOUNTER — ANESTHESIA (OUTPATIENT)
Dept: SURGERY | Facility: CLINIC | Age: 53
DRG: 327 | End: 2018-01-29
Payer: MEDICARE

## 2018-01-29 DIAGNOSIS — K91.89 COMPLICATIONS OF GASTRIC BYPASS SURGERY: Primary | ICD-10-CM
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
## 2018-01-29 LAB
CREAT SERPL-MCNC: 0.96 MG/DL (ref 0.52–1.04)
GFR SERPL CREATININE-BSD FRML MDRD: 61 ML/MIN/1.7M2
GLUCOSE BLDC GLUCOMTR-MCNC: 110 MG/DL (ref 70–99)
PLATELET # BLD AUTO: 296 10E9/L (ref 150–450)

## 2018-01-29 PROCEDURE — 00000146 ZZHCL STATISTIC GLUCOSE BY METER IP

## 2018-01-29 PROCEDURE — 82565 ASSAY OF CREATININE: CPT | Performed by: STUDENT IN AN ORGANIZED HEALTH CARE EDUCATION/TRAINING PROGRAM

## 2018-01-29 PROCEDURE — 25000566 ZZH SEVOFLURANE, EA 15 MIN: Performed by: SURGERY

## 2018-01-29 PROCEDURE — 36415 COLL VENOUS BLD VENIPUNCTURE: CPT | Performed by: STUDENT IN AN ORGANIZED HEALTH CARE EDUCATION/TRAINING PROGRAM

## 2018-01-29 PROCEDURE — 0DJ08ZZ INSPECTION OF UPPER INTESTINAL TRACT, VIA NATURAL OR ARTIFICIAL OPENING ENDOSCOPIC: ICD-10-PCS | Performed by: SURGERY

## 2018-01-29 PROCEDURE — 40000171 ZZH STATISTIC PRE-PROCEDURE ASSESSMENT III: Performed by: SURGERY

## 2018-01-29 PROCEDURE — 25000132 ZZH RX MED GY IP 250 OP 250 PS 637: Mod: GY | Performed by: STUDENT IN AN ORGANIZED HEALTH CARE EDUCATION/TRAINING PROGRAM

## 2018-01-29 PROCEDURE — 25000128 H RX IP 250 OP 636: Performed by: SURGERY

## 2018-01-29 PROCEDURE — 88305 TISSUE EXAM BY PATHOLOGIST: CPT | Performed by: SURGERY

## 2018-01-29 PROCEDURE — 0DSA4ZZ REPOSITION JEJUNUM, PERCUTANEOUS ENDOSCOPIC APPROACH: ICD-10-PCS | Performed by: SURGERY

## 2018-01-29 PROCEDURE — 37000008 ZZH ANESTHESIA TECHNICAL FEE, 1ST 30 MIN: Performed by: SURGERY

## 2018-01-29 PROCEDURE — 36000062 ZZH SURGERY LEVEL 4 1ST 30 MIN - UMMC: Performed by: SURGERY

## 2018-01-29 PROCEDURE — P9041 ALBUMIN (HUMAN),5%, 50ML: HCPCS | Performed by: NURSE ANESTHETIST, CERTIFIED REGISTERED

## 2018-01-29 PROCEDURE — C9399 UNCLASSIFIED DRUGS OR BIOLOG: HCPCS | Performed by: NURSE ANESTHETIST, CERTIFIED REGISTERED

## 2018-01-29 PROCEDURE — 0BQT4ZZ REPAIR DIAPHRAGM, PERCUTANEOUS ENDOSCOPIC APPROACH: ICD-10-PCS | Performed by: SURGERY

## 2018-01-29 PROCEDURE — 25800025 ZZH RX 258: Performed by: SURGERY

## 2018-01-29 PROCEDURE — 25000125 ZZHC RX 250

## 2018-01-29 PROCEDURE — 12000008 ZZH R&B INTERMEDIATE UMMC

## 2018-01-29 PROCEDURE — 37000009 ZZH ANESTHESIA TECHNICAL FEE, EACH ADDTL 15 MIN: Performed by: SURGERY

## 2018-01-29 PROCEDURE — 25000132 ZZH RX MED GY IP 250 OP 250 PS 637: Mod: GY | Performed by: ANESTHESIOLOGY

## 2018-01-29 PROCEDURE — 71000014 ZZH RECOVERY PHASE 1 LEVEL 2 FIRST HR: Performed by: SURGERY

## 2018-01-29 PROCEDURE — 27210995 ZZH RX 272: Performed by: SURGERY

## 2018-01-29 PROCEDURE — 25000128 H RX IP 250 OP 636: Performed by: ANESTHESIOLOGY

## 2018-01-29 PROCEDURE — 25000125 ZZHC RX 250: Performed by: NURSE ANESTHETIST, CERTIFIED REGISTERED

## 2018-01-29 PROCEDURE — 71000015 ZZH RECOVERY PHASE 1 LEVEL 2 EA ADDTL HR: Performed by: SURGERY

## 2018-01-29 PROCEDURE — A9270 NON-COVERED ITEM OR SERVICE: HCPCS | Mod: GY | Performed by: STUDENT IN AN ORGANIZED HEALTH CARE EDUCATION/TRAINING PROGRAM

## 2018-01-29 PROCEDURE — 25000128 H RX IP 250 OP 636: Performed by: STUDENT IN AN ORGANIZED HEALTH CARE EDUCATION/TRAINING PROGRAM

## 2018-01-29 PROCEDURE — 36000064 ZZH SURGERY LEVEL 4 EA 15 ADDTL MIN - UMMC: Performed by: SURGERY

## 2018-01-29 PROCEDURE — 25000128 H RX IP 250 OP 636

## 2018-01-29 PROCEDURE — 0DBA4ZZ EXCISION OF JEJUNUM, PERCUTANEOUS ENDOSCOPIC APPROACH: ICD-10-PCS | Performed by: SURGERY

## 2018-01-29 PROCEDURE — 25000128 H RX IP 250 OP 636: Performed by: NURSE ANESTHETIST, CERTIFIED REGISTERED

## 2018-01-29 PROCEDURE — A9270 NON-COVERED ITEM OR SERVICE: HCPCS | Mod: GY | Performed by: ANESTHESIOLOGY

## 2018-01-29 PROCEDURE — 85049 AUTOMATED PLATELET COUNT: CPT | Performed by: STUDENT IN AN ORGANIZED HEALTH CARE EDUCATION/TRAINING PROGRAM

## 2018-01-29 PROCEDURE — 25000128 H RX IP 250 OP 636: Performed by: PHYSICIAN ASSISTANT

## 2018-01-29 PROCEDURE — 27210794 ZZH OR GENERAL SUPPLY STERILE: Performed by: SURGERY

## 2018-01-29 RX ORDER — CELECOXIB 50 MG/1
100 CAPSULE ORAL ONCE
Status: COMPLETED | OUTPATIENT
Start: 2018-01-29 | End: 2018-01-29

## 2018-01-29 RX ORDER — ONDANSETRON 2 MG/ML
4 INJECTION INTRAMUSCULAR; INTRAVENOUS EVERY 6 HOURS PRN
Status: DISCONTINUED | OUTPATIENT
Start: 2018-01-29 | End: 2018-02-01 | Stop reason: HOSPADM

## 2018-01-29 RX ORDER — ONDANSETRON 2 MG/ML
INJECTION INTRAMUSCULAR; INTRAVENOUS PRN
Status: DISCONTINUED | OUTPATIENT
Start: 2018-01-29 | End: 2018-01-29

## 2018-01-29 RX ORDER — LABETALOL HYDROCHLORIDE 5 MG/ML
10 INJECTION, SOLUTION INTRAVENOUS EVERY 10 MIN PRN
Status: COMPLETED | OUTPATIENT
Start: 2018-01-29 | End: 2018-01-29

## 2018-01-29 RX ORDER — DEXAMETHASONE SODIUM PHOSPHATE 4 MG/ML
INJECTION, SOLUTION INTRA-ARTICULAR; INTRALESIONAL; INTRAMUSCULAR; INTRAVENOUS; SOFT TISSUE PRN
Status: DISCONTINUED | OUTPATIENT
Start: 2018-01-29 | End: 2018-01-29

## 2018-01-29 RX ORDER — SODIUM CHLORIDE, SODIUM LACTATE, POTASSIUM CHLORIDE, CALCIUM CHLORIDE 600; 310; 30; 20 MG/100ML; MG/100ML; MG/100ML; MG/100ML
INJECTION, SOLUTION INTRAVENOUS CONTINUOUS
Status: DISCONTINUED | OUTPATIENT
Start: 2018-01-29 | End: 2018-01-29 | Stop reason: HOSPADM

## 2018-01-29 RX ORDER — ACETAMINOPHEN 325 MG/1
975 TABLET ORAL ONCE
Status: COMPLETED | OUTPATIENT
Start: 2018-01-29 | End: 2018-01-29

## 2018-01-29 RX ORDER — ONDANSETRON 4 MG/1
4 TABLET, ORALLY DISINTEGRATING ORAL EVERY 6 HOURS PRN
Status: DISCONTINUED | OUTPATIENT
Start: 2018-01-29 | End: 2018-02-01 | Stop reason: HOSPADM

## 2018-01-29 RX ORDER — FENTANYL CITRATE 50 UG/ML
INJECTION, SOLUTION INTRAMUSCULAR; INTRAVENOUS PRN
Status: DISCONTINUED | OUTPATIENT
Start: 2018-01-29 | End: 2018-01-29

## 2018-01-29 RX ORDER — ACETAMINOPHEN 325 MG/1
975 TABLET ORAL EVERY 8 HOURS
Status: DISCONTINUED | OUTPATIENT
Start: 2018-01-29 | End: 2018-02-01 | Stop reason: HOSPADM

## 2018-01-29 RX ORDER — GLYCOPYRROLATE 0.2 MG/ML
INJECTION, SOLUTION INTRAMUSCULAR; INTRAVENOUS PRN
Status: DISCONTINUED | OUTPATIENT
Start: 2018-01-29 | End: 2018-01-29

## 2018-01-29 RX ORDER — LIDOCAINE 40 MG/G
CREAM TOPICAL
Status: DISCONTINUED | OUTPATIENT
Start: 2018-01-29 | End: 2018-02-01 | Stop reason: HOSPADM

## 2018-01-29 RX ORDER — LIDOCAINE HYDROCHLORIDE 20 MG/ML
INJECTION, SOLUTION INFILTRATION; PERINEURAL PRN
Status: DISCONTINUED | OUTPATIENT
Start: 2018-01-29 | End: 2018-01-29

## 2018-01-29 RX ORDER — NALOXONE HYDROCHLORIDE 0.4 MG/ML
.1-.4 INJECTION, SOLUTION INTRAMUSCULAR; INTRAVENOUS; SUBCUTANEOUS
Status: ACTIVE | OUTPATIENT
Start: 2018-01-29 | End: 2018-01-30

## 2018-01-29 RX ORDER — BUSPIRONE HYDROCHLORIDE 15 MG/1
30 TABLET ORAL 2 TIMES DAILY
Status: DISCONTINUED | OUTPATIENT
Start: 2018-01-29 | End: 2018-02-01 | Stop reason: HOSPADM

## 2018-01-29 RX ORDER — LIDOCAINE 40 MG/G
CREAM TOPICAL
Status: DISCONTINUED | OUTPATIENT
Start: 2018-01-29 | End: 2018-01-29 | Stop reason: HOSPADM

## 2018-01-29 RX ORDER — LABETALOL HYDROCHLORIDE 5 MG/ML
10 INJECTION, SOLUTION INTRAVENOUS EVERY 10 MIN PRN
Status: DISCONTINUED | OUTPATIENT
Start: 2018-01-29 | End: 2018-01-29 | Stop reason: HOSPADM

## 2018-01-29 RX ORDER — PROCHLORPERAZINE MALEATE 5 MG
10 TABLET ORAL EVERY 6 HOURS PRN
Status: DISCONTINUED | OUTPATIENT
Start: 2018-01-29 | End: 2018-02-01 | Stop reason: HOSPADM

## 2018-01-29 RX ORDER — SODIUM CHLORIDE, SODIUM LACTATE, POTASSIUM CHLORIDE, CALCIUM CHLORIDE 600; 310; 30; 20 MG/100ML; MG/100ML; MG/100ML; MG/100ML
INJECTION, SOLUTION INTRAVENOUS CONTINUOUS
Status: DISCONTINUED | OUTPATIENT
Start: 2018-01-29 | End: 2018-01-30

## 2018-01-29 RX ORDER — TOPIRAMATE 100 MG/1
100 TABLET, FILM COATED ORAL 3 TIMES DAILY
Status: DISCONTINUED | OUTPATIENT
Start: 2018-01-29 | End: 2018-02-01 | Stop reason: HOSPADM

## 2018-01-29 RX ORDER — PROPOFOL 10 MG/ML
INJECTION, EMULSION INTRAVENOUS PRN
Status: DISCONTINUED | OUTPATIENT
Start: 2018-01-29 | End: 2018-01-29

## 2018-01-29 RX ORDER — ACETAMINOPHEN 10 MG/ML
1000 INJECTION, SOLUTION INTRAVENOUS ONCE
Status: COMPLETED | OUTPATIENT
Start: 2018-01-29 | End: 2018-01-29

## 2018-01-29 RX ORDER — EPHEDRINE SULFATE 50 MG/ML
INJECTION, SOLUTION INTRAMUSCULAR; INTRAVENOUS; SUBCUTANEOUS PRN
Status: DISCONTINUED | OUTPATIENT
Start: 2018-01-29 | End: 2018-01-29

## 2018-01-29 RX ORDER — GABAPENTIN 400 MG/1
800 CAPSULE ORAL 4 TIMES DAILY
Status: DISCONTINUED | OUTPATIENT
Start: 2018-01-29 | End: 2018-02-01 | Stop reason: HOSPADM

## 2018-01-29 RX ORDER — ALBUMIN, HUMAN INJ 5% 5 %
SOLUTION INTRAVENOUS CONTINUOUS PRN
Status: DISCONTINUED | OUTPATIENT
Start: 2018-01-29 | End: 2018-01-29

## 2018-01-29 RX ORDER — CEFAZOLIN SODIUM 1 G/3ML
1 INJECTION, POWDER, FOR SOLUTION INTRAMUSCULAR; INTRAVENOUS SEE ADMIN INSTRUCTIONS
Status: DISCONTINUED | OUTPATIENT
Start: 2018-01-29 | End: 2018-01-29 | Stop reason: HOSPADM

## 2018-01-29 RX ORDER — ONDANSETRON 2 MG/ML
4 INJECTION INTRAMUSCULAR; INTRAVENOUS EVERY 30 MIN PRN
Status: DISCONTINUED | OUTPATIENT
Start: 2018-01-29 | End: 2018-01-29 | Stop reason: HOSPADM

## 2018-01-29 RX ORDER — GABAPENTIN 300 MG/1
300 CAPSULE ORAL ONCE
Status: COMPLETED | OUTPATIENT
Start: 2018-01-29 | End: 2018-01-29

## 2018-01-29 RX ORDER — CEFAZOLIN SODIUM 2 G/100ML
2 INJECTION, SOLUTION INTRAVENOUS
Status: COMPLETED | OUTPATIENT
Start: 2018-01-29 | End: 2018-01-29

## 2018-01-29 RX ORDER — ONDANSETRON 4 MG/1
4 TABLET, ORALLY DISINTEGRATING ORAL EVERY 30 MIN PRN
Status: DISCONTINUED | OUTPATIENT
Start: 2018-01-29 | End: 2018-01-29 | Stop reason: HOSPADM

## 2018-01-29 RX ORDER — NALOXONE HYDROCHLORIDE 0.4 MG/ML
.1-.4 INJECTION, SOLUTION INTRAMUSCULAR; INTRAVENOUS; SUBCUTANEOUS
Status: DISCONTINUED | OUTPATIENT
Start: 2018-01-29 | End: 2018-02-01 | Stop reason: HOSPADM

## 2018-01-29 RX ADMIN — Medication 10 MG: at 15:30

## 2018-01-29 RX ADMIN — ALBUMIN HUMAN: 0.05 INJECTION, SOLUTION INTRAVENOUS at 08:10

## 2018-01-29 RX ADMIN — FENTANYL CITRATE 50 MCG: 50 INJECTION, SOLUTION INTRAMUSCULAR; INTRAVENOUS at 08:11

## 2018-01-29 RX ADMIN — LIDOCAINE HYDROCHLORIDE 2 MG/MIN: 20 INJECTION, SOLUTION INTRAVENOUS at 08:20

## 2018-01-29 RX ADMIN — PHENYLEPHRINE HYDROCHLORIDE 100 MCG: 10 INJECTION, SOLUTION INTRAMUSCULAR; INTRAVENOUS; SUBCUTANEOUS at 08:05

## 2018-01-29 RX ADMIN — ACETAMINOPHEN 975 MG: 325 TABLET, FILM COATED ORAL at 06:04

## 2018-01-29 RX ADMIN — SODIUM CHLORIDE, POTASSIUM CHLORIDE, SODIUM LACTATE AND CALCIUM CHLORIDE: 600; 310; 30; 20 INJECTION, SOLUTION INTRAVENOUS at 10:45

## 2018-01-29 RX ADMIN — ROCURONIUM BROMIDE 10 MG: 10 INJECTION INTRAVENOUS at 09:58

## 2018-01-29 RX ADMIN — DEXAMETHASONE SODIUM PHOSPHATE 8 MG: 4 INJECTION, SOLUTION INTRA-ARTICULAR; INTRALESIONAL; INTRAMUSCULAR; INTRAVENOUS; SOFT TISSUE at 08:00

## 2018-01-29 RX ADMIN — Medication 10 MG: at 15:41

## 2018-01-29 RX ADMIN — ROCURONIUM BROMIDE 10 MG: 10 INJECTION INTRAVENOUS at 13:59

## 2018-01-29 RX ADMIN — TRAZODONE HYDROCHLORIDE 150 MG: 100 TABLET ORAL at 23:05

## 2018-01-29 RX ADMIN — GABAPENTIN 300 MG: 300 CAPSULE ORAL at 06:04

## 2018-01-29 RX ADMIN — SODIUM CHLORIDE, POTASSIUM CHLORIDE, SODIUM LACTATE AND CALCIUM CHLORIDE: 600; 310; 30; 20 INJECTION, SOLUTION INTRAVENOUS at 15:15

## 2018-01-29 RX ADMIN — ENOXAPARIN SODIUM 40 MG: 40 INJECTION SUBCUTANEOUS at 08:08

## 2018-01-29 RX ADMIN — LIDOCAINE HYDROCHLORIDE 100 MG: 20 INJECTION, SOLUTION INFILTRATION; PERINEURAL at 07:39

## 2018-01-29 RX ADMIN — SODIUM CHLORIDE, POTASSIUM CHLORIDE, SODIUM LACTATE AND CALCIUM CHLORIDE: 600; 310; 30; 20 INJECTION, SOLUTION INTRAVENOUS at 07:27

## 2018-01-29 RX ADMIN — BUSPIRONE HYDROCHLORIDE 30 MG: 15 TABLET ORAL at 20:02

## 2018-01-29 RX ADMIN — RANITIDINE 150 MG: 150 TABLET, FILM COATED ORAL at 20:02

## 2018-01-29 RX ADMIN — HYDROMORPHONE HYDROCHLORIDE 0.5 MG: 1 INJECTION, SOLUTION INTRAMUSCULAR; INTRAVENOUS; SUBCUTANEOUS at 15:13

## 2018-01-29 RX ADMIN — LEVOTHYROXINE SODIUM 137 MCG: 25 TABLET ORAL at 18:22

## 2018-01-29 RX ADMIN — ALBUMIN HUMAN: 0.05 INJECTION, SOLUTION INTRAVENOUS at 08:39

## 2018-01-29 RX ADMIN — FENTANYL CITRATE 50 MCG: 50 INJECTION, SOLUTION INTRAMUSCULAR; INTRAVENOUS at 07:33

## 2018-01-29 RX ADMIN — CEFAZOLIN 1 G: 1 INJECTION, POWDER, FOR SOLUTION INTRAMUSCULAR; INTRAVENOUS at 10:04

## 2018-01-29 RX ADMIN — ONDANSETRON 4 MG: 2 INJECTION INTRAMUSCULAR; INTRAVENOUS at 14:49

## 2018-01-29 RX ADMIN — GABAPENTIN 800 MG: 400 CAPSULE ORAL at 20:02

## 2018-01-29 RX ADMIN — ROCURONIUM BROMIDE 30 MG: 10 INJECTION INTRAVENOUS at 10:57

## 2018-01-29 RX ADMIN — PHENYLEPHRINE HYDROCHLORIDE 100 MCG: 10 INJECTION, SOLUTION INTRAMUSCULAR; INTRAVENOUS; SUBCUTANEOUS at 07:54

## 2018-01-29 RX ADMIN — HYDROMORPHONE HYDROCHLORIDE 0.2 MG: 1 INJECTION, SOLUTION INTRAMUSCULAR; INTRAVENOUS; SUBCUTANEOUS at 16:09

## 2018-01-29 RX ADMIN — HYDROMORPHONE HYDROCHLORIDE 0.5 MG: 1 INJECTION, SOLUTION INTRAMUSCULAR; INTRAVENOUS; SUBCUTANEOUS at 11:32

## 2018-01-29 RX ADMIN — ACETAMINOPHEN 1000 MG: 10 INJECTION, SOLUTION INTRAVENOUS at 15:02

## 2018-01-29 RX ADMIN — ROCURONIUM BROMIDE 20 MG: 10 INJECTION INTRAVENOUS at 08:53

## 2018-01-29 RX ADMIN — Medication 10 MG: at 07:50

## 2018-01-29 RX ADMIN — ROCURONIUM BROMIDE 20 MG: 10 INJECTION INTRAVENOUS at 13:03

## 2018-01-29 RX ADMIN — HYDROMORPHONE HYDROCHLORIDE 0.5 MG: 1 INJECTION, SOLUTION INTRAMUSCULAR; INTRAVENOUS; SUBCUTANEOUS at 12:13

## 2018-01-29 RX ADMIN — ROCURONIUM BROMIDE 30 MG: 10 INJECTION INTRAVENOUS at 08:13

## 2018-01-29 RX ADMIN — Medication 10 MG: at 16:08

## 2018-01-29 RX ADMIN — TOPIRAMATE 100 MG: 100 TABLET, FILM COATED ORAL at 20:02

## 2018-01-29 RX ADMIN — GLYCOPYRROLATE 0.2 MG: 0.2 INJECTION, SOLUTION INTRAMUSCULAR; INTRAVENOUS at 07:57

## 2018-01-29 RX ADMIN — FENTANYL CITRATE 100 MCG: 50 INJECTION, SOLUTION INTRAMUSCULAR; INTRAVENOUS at 07:39

## 2018-01-29 RX ADMIN — PHENYLEPHRINE HYDROCHLORIDE 100 MCG: 10 INJECTION, SOLUTION INTRAMUSCULAR; INTRAVENOUS; SUBCUTANEOUS at 09:07

## 2018-01-29 RX ADMIN — CEFAZOLIN SODIUM 2 G: 2 INJECTION, SOLUTION INTRAVENOUS at 07:55

## 2018-01-29 RX ADMIN — Medication 10 MG: at 07:47

## 2018-01-29 RX ADMIN — ROCURONIUM BROMIDE 20 MG: 10 INJECTION INTRAVENOUS at 09:20

## 2018-01-29 RX ADMIN — HYDROMORPHONE HYDROCHLORIDE 0.3 MG: 1 INJECTION, SOLUTION INTRAMUSCULAR; INTRAVENOUS; SUBCUTANEOUS at 14:53

## 2018-01-29 RX ADMIN — CEFAZOLIN 1 G: 1 INJECTION, POWDER, FOR SOLUTION INTRAMUSCULAR; INTRAVENOUS at 12:07

## 2018-01-29 RX ADMIN — GABAPENTIN 800 MG: 400 CAPSULE ORAL at 18:22

## 2018-01-29 RX ADMIN — ONDANSETRON 4 MG: 2 INJECTION INTRAMUSCULAR; INTRAVENOUS at 14:23

## 2018-01-29 RX ADMIN — HYDROMORPHONE HYDROCHLORIDE 0.5 MG: 1 INJECTION, SOLUTION INTRAMUSCULAR; INTRAVENOUS; SUBCUTANEOUS at 09:44

## 2018-01-29 RX ADMIN — ROCURONIUM BROMIDE 20 MG: 10 INJECTION INTRAVENOUS at 13:35

## 2018-01-29 RX ADMIN — PHENYLEPHRINE HYDROCHLORIDE 100 MCG: 10 INJECTION, SOLUTION INTRAMUSCULAR; INTRAVENOUS; SUBCUTANEOUS at 10:05

## 2018-01-29 RX ADMIN — CELECOXIB 100 MG: 50 CAPSULE ORAL at 06:04

## 2018-01-29 RX ADMIN — ROCURONIUM BROMIDE 20 MG: 10 INJECTION INTRAVENOUS at 11:50

## 2018-01-29 RX ADMIN — ROCURONIUM BROMIDE 50 MG: 10 INJECTION INTRAVENOUS at 07:39

## 2018-01-29 RX ADMIN — SUGAMMADEX 340 MG: 100 INJECTION, SOLUTION INTRAVENOUS at 14:18

## 2018-01-29 RX ADMIN — HYDROMORPHONE HYDROCHLORIDE: 10 INJECTION, SOLUTION INTRAMUSCULAR; INTRAVENOUS; SUBCUTANEOUS at 15:12

## 2018-01-29 RX ADMIN — HYDROMORPHONE HYDROCHLORIDE: 10 INJECTION, SOLUTION INTRAMUSCULAR; INTRAVENOUS; SUBCUTANEOUS at 22:38

## 2018-01-29 RX ADMIN — MIDAZOLAM 2 MG: 1 INJECTION INTRAMUSCULAR; INTRAVENOUS at 07:27

## 2018-01-29 RX ADMIN — ROCURONIUM BROMIDE 20 MG: 10 INJECTION INTRAVENOUS at 11:29

## 2018-01-29 RX ADMIN — PHENYLEPHRINE HYDROCHLORIDE 100 MCG: 10 INJECTION, SOLUTION INTRAMUSCULAR; INTRAVENOUS; SUBCUTANEOUS at 08:38

## 2018-01-29 RX ADMIN — Medication 10 MG: at 15:09

## 2018-01-29 RX ADMIN — PHENYLEPHRINE HYDROCHLORIDE 100 MCG: 10 INJECTION, SOLUTION INTRAMUSCULAR; INTRAVENOUS; SUBCUTANEOUS at 09:15

## 2018-01-29 RX ADMIN — PROPOFOL 80 MG: 10 INJECTION, EMULSION INTRAVENOUS at 07:39

## 2018-01-29 RX ADMIN — Medication 5 MG: at 08:05

## 2018-01-29 RX ADMIN — ROCURONIUM BROMIDE 20 MG: 10 INJECTION INTRAVENOUS at 10:28

## 2018-01-29 RX ADMIN — CEFAZOLIN 1 G: 1 INJECTION, POWDER, FOR SOLUTION INTRAMUSCULAR; INTRAVENOUS at 14:12

## 2018-01-29 RX ADMIN — PHENYLEPHRINE HYDROCHLORIDE 100 MCG: 10 INJECTION, SOLUTION INTRAMUSCULAR; INTRAVENOUS; SUBCUTANEOUS at 13:52

## 2018-01-29 RX ADMIN — Medication 10 MG: at 15:19

## 2018-01-29 RX ADMIN — HYDROMORPHONE HYDROCHLORIDE 0.5 MG: 1 INJECTION, SOLUTION INTRAMUSCULAR; INTRAVENOUS; SUBCUTANEOUS at 15:37

## 2018-01-29 RX ADMIN — HYDROMORPHONE HYDROCHLORIDE 0.5 MG: 1 INJECTION, SOLUTION INTRAMUSCULAR; INTRAVENOUS; SUBCUTANEOUS at 15:01

## 2018-01-29 RX ADMIN — ROCURONIUM BROMIDE 20 MG: 10 INJECTION INTRAVENOUS at 12:32

## 2018-01-29 NOTE — OR NURSING
Dr Antunez at bedside in PACU. Criteria met at 1600. BP's around 120 despite 40 mg labetalol given. Dr Antuenz to order more labetalol & ok to go upstairs if BP's consistently under 120.

## 2018-01-29 NOTE — ANESTHESIA POSTPROCEDURE EVALUATION
Patient: Savanah Payton    Procedure(s):  Laparoscopic Reversal Of Aaron En Y Gastric Bypass, Hiatal Hernia Repair - Wound Class: III-Contaminated    Diagnosis:Bariatric Surgery Status   Diagnosis Additional Information: No value filed.    Anesthesia Type:  General    Note:  Anesthesia Post Evaluation    Patient location during evaluation: PACU  Patient participation: Able to fully participate in evaluation  Level of consciousness: awake and alert  Pain management: adequate  Airway patency: patent  Cardiovascular status: blood pressure returned to baseline and hemodynamically stable  Respiratory status: acceptable and nasal cannula  Hydration status: acceptable  PONV: none     Anesthetic complications: None    Comments: SBP mostly controlled under 125mmHg with labetalol. Pt informed and still receiving labetalol as needed        Last vitals:  Vitals:    01/29/18 1530 01/29/18 1545 01/29/18 1600   BP: 123/64 115/66 122/71   Pulse:      Resp: 16 16 16   Temp:      SpO2: 100% 100% 100%         Electronically Signed By: Rasheed Antunez MD  January 29, 2018  4:09 PM

## 2018-01-29 NOTE — ANESTHESIA CARE TRANSFER NOTE
Patient: Savanah Payton    Procedure(s):  Laparoscopic Reversal Of Aaron En Y Gastric Bypass, Hiatal Hernia Repair - Wound Class: III-Contaminated    Diagnosis: Bariatric Surgery Status   Diagnosis Additional Information: No value filed.    Anesthesia Type:   General     Note:  Airway :Face Mask  Patient transferred to:PACU  Comments: Pt denies pain or nausea. Report given to RN. Handoff Report: Identifed the Patient, Identified the Reponsible Provider, Reviewed the pertinent medical history, Discussed the surgical course, Reviewed Intra-OP anesthesia mangement and issues during anesthesia, Set expectations for post-procedure period and Allowed opportunity for questions and acknowledgement of understanding      Vitals: (Last set prior to Anesthesia Care Transfer)    CRNA VITALS  1/29/2018 1405 - 1/29/2018 1436      1/29/2018             NIBP: 125/78    Pulse: 86    SpO2: 100 %    Resp Rate (observed): 16                Electronically Signed By: MEDINA Banks CRNA  January 29, 2018  2:36 PM

## 2018-01-29 NOTE — IP AVS SNAPSHOT
[address] [address]    Phone:  [phone]                                       After Visit Summary   1/29/2018    Savanah Payton    MRN: [id number]           After Visit Summary Signature Page     I have received my discharge instructions, and my questions have been answered. I have discussed any challenges I see with this plan with the nurse or doctor.    ..........................................................................................................................................  Patient/Patient Representative Signature      ..........................................................................................................................................  Patient Representative Print Name and Relationship to Patient    ..................................................               ................................................  Date                                            Time    ..........................................................................................................................................  Reviewed by Signature/Title    ...................................................              ..............................................  Date                                                            Time

## 2018-01-29 NOTE — IP AVS SNAPSHOT
MRN:[id number]                      After Visit Summary   1/29/2018    Savanah Payton    MRN: [id number]           Thank you!     Thank you for choosing [facility] for your care. Our goal is always to provide you with excellent care. Hearing back from our patients is one way we can continue to improve our services. Please take a few minutes to complete the written survey that you may receive in the mail after you visit with us. Thank you!        Patient Information     Date Of Birth          1965        Designated Caregiver       Most Recent Value    Caregiver    Will someone help with your care after discharge? no [lives with / [place]]      About your hospital stay     You were admitted on:  January 29, 2018 You last received care in the:  [address] [facility]    You were discharged on:  February 1, 2018        Reason for your hospital stay       Ms. Payton was admitted for a planned operation to reverse her gerard-en-y gastric bypass due to marginal ulcers                  Who to Call     For medical emergencies, please call 911.  For non-urgent questions about your medical care, please call your primary care provider or clinic, None  For questions related to your surgery, please call your surgery clinic        Attending Provider     Provider Specialty    Mango Gonzalez MD General Surgery       Primary Care Provider Fax #    Provider Not In System [phone]      After Care Instructions     Activity       Your activity upon discharge: activity as tolerated. No strenuous exercise for 4-6 weeks after surgery. Please do not drive if you are taking narcotic pain medications.            Diet       Follow this diet upon discharge: clear liquid diet for one week    Plan to advance to pureed foods thereafter; will be discussed with Dr. Gonzalez            Discharge Instructions       No lifting >10 pounds for 4-6 weeks. Discuss with your surgeon at follow up appointment  May shower  starting postoperative day #1 but no scrubbing incisions. No bathing or soaking incisions for 2 weeks or until incisions completely healed.  Wound care: Keep clean and dry. Steri strips or glue will fall off on their own.   After surgery it is ok to swallow medications smaller than 1/4 inch(size of pencil eraser) for all procedures.  If medication is larger than 1/4 inch then it will need to be crushed, cut or in liquid form for 1-2 months after surgery. This can be discussed with surgeon team at the 1 month follow up appointment and will depend on patient tolerance.  Follow-up with your surgeon team in 1-2 weeks. If this appointment was not previous made for you please call [phone] and choose option #1 to schedule your follow-up appointment.   You will receive a call from our clinic nurse after surgery.  If you have any questions and would like to speak with a nurse please call [phone] and choose option #3 to speak with a triage nurse.  Call [phone] and ask to speak with surgery resident if you are having troubles in the evenings, at night, or on weekends. Please call if you experience increasing abdominal pain, nausea, vomiting, increasing drainage from your wounds, chills, or fever >101.5  Take stool softener while taking narcotic pain medication.  No driving for at least 12 hours after taking narcotic pain medication.  Appointments located at [facility] clinic:  [address]  [place] [address]                  Follow-up Appointments     Adult [facility] Follow-up and recommended labs and tests       Follow up with Dr. Gonzalez in one week    Appointments on [place] and/or [facility] (with [facility] or [facility] provider or service). Call [phone] if you haven't heard regarding these appointments within 7 days of discharge.                  Additional Information     If you use hormonal birth control (such as the pill, patch, ring or implants): You'll need a second form  "of birth control for 7 days (condoms, a diaphragm or contraceptive foam). While in the hospital, you received a medicine called Bridion. Your normal birth control will not work as well for a week after taking this medicine.          Pending Results     No orders found from 1/27/2018 to 1/30/2018.            Statement of Approval     Ordered          02/01/18 0941  I have reviewed and agree with all the recommendations and orders detailed in this document.  EFFECTIVE NOW     Approved and electronically signed by:  Angely Johnson MD             Admission Information     Date & Time Provider Department Dept. Phone    1/29/2018 Mango Gonzalez MD [address] [facility] [phone]      Your Vitals Were     Blood Pressure Pulse Temperature Respirations Height Weight    104/63 72 97.9  F (36.6  C) (Oral) 16 1.626 m (5' 4\") 85.2 kg (187 lb 13.3 oz)    Pulse Oximetry BMI (Body Mass Index)                98% 32.24 kg/m2          [facility] Information     [employer] gives you secure access to your electronic health record. If you see a primary care provider, you can also send messages to your care team and make appointments. If you have questions, please call your primary care clinic.  If you do not have a primary care provider, please call [phone] and they will assist you.        Care EveryWhere ID     This is your Care EveryWhere ID. This could be used by other organizations to access your [facility] medical records  [id number]        Equal Access to Services     VARSHA HARKINS : Hadii france Nguyễn, waaxda luqadaha, qaybta kaalmada darinda, david león. So [facility] [phone].    ATENCIÓN: Si habla español, tiene a kearns disposición servicios gratuitos de asistencia lingüística. Llame al [phone].    We comply with applicable federal civil rights laws and [place] laws. We do not discriminate on the basis of race, color, national origin, age, disability, sex, sexual " orientation, or gender identity.               Review of your medicines      START taking        Dose / Directions    oxyCODONE IR 5 MG tablet   Commonly known as:  ROXICODONE        Dose:  5-10 mg   Take 1-2 tablets (5-10 mg) by mouth every 6 hours as needed for moderate to severe pain   Quantity:  20 tablet   Refills:  0         CONTINUE these medicines which have NOT CHANGED        Dose / Directions    BUSPAR PO        Dose:  30 mg   Take 30 mg by mouth 2 times daily   Refills:  0       Calcium carb-Vitamin D 500 mg [place]-200 units 500-200 MG-UNIT per tablet   Commonly known as:  OSCAL with D;Oyster Shell Calcium        Dose:  1 tablet   Take 1 tablet by mouth 2 times daily (with meals)   Refills:  0       GABAPENTIN PO        Dose:  800 mg   Take 800 mg by mouth 4 times daily   Refills:  0       LEVOTHYROXINE SODIUM PO        Dose:  137 mcg   Take 137 mcg by mouth daily   Refills:  0       LISINOPRIL PO        Dose:  10 mg   Take 10 mg by mouth daily   Refills:  0       MAGNESIUM OXIDE PO        Dose:  500 mg   Take 500 mg by mouth daily   Refills:  0       PROMETHAZINE HCL RE        Dose:  25 mg   Place 25 mg rectally daily   Refills:  0       RANITIDINE HCL PO        Dose:  150 mg   Take 150 mg by mouth 2 times daily   Refills:  0       sucralfate 1 GM tablet   Commonly known as:  CARAFATE        Take by mouth 4 times daily   Refills:  0       TOPIRAMATE PO        Dose:  100 mg   Take 100 mg by mouth 3 times daily   Refills:  0       VITAMIN B-12 PO        Dose:  2500 mcg   Take 2,500 mcg by mouth daily   Refills:  0            Where to get your medicines      Some of these will need a paper prescription and others can be bought over the counter. Ask your nurse if you have questions.     Bring a paper prescription for each of these medications     oxyCODONE IR 5 MG tablet                Protect others around you: Learn how to safely use, store and throw away your medicines at www.disposemymeds.org.         Information about OPIOIDS     PRESCRIPTION OPIOIDS: WHAT YOU NEED TO KNOW    Prescription opioids can be used to help relieve moderate to severe pain and are often prescribed following a surgery or injury, or for certain health conditions. These medications can be an important part of treatment but also come with serious risks. It is important to work with your health care provider to make sure you are getting the safest, most effective care.    WHAT ARE THE RISKS AND SIDE EFFECTS OF OPIOID USE?  Prescription opioids carry serious risks of addiction and overdose, especially with prolonged use. An opioid overdose, often marked by slowed breathing can cause sudden death. The use of prescription opioids can have a number of side effects as well, even when taken as directed:      Tolerance - meaning you might need to take more of a medication for the same pain relief    Physical dependence - meaning you have symptoms of withdrawal when a medication is stopped    Increased sensitivity to pain    Constipation    Nausea, vomiting, and dry mouth    Sleepiness and dizziness    Confusion    Depression    Low levels of testosterone that can result in lower sex drive, energy, and strength    Itching and sweating    RISKS ARE GREATER WITH:    History of drug misuse, substance use disorder, or overdose    Mental health conditions (such as depression or anxiety)    Sleep apnea    Older age (65 years or older)    Pregnancy    Avoid alcohol while taking prescription opioids.   Also, unless specifically advised by your health care provider, medications to avoid include:    Benzodiazepines (such as Xanax or Valium)    Muscle relaxants (such as Soma or Flexeril)    Hypnotics (such as Ambien or Lunesta)    Other prescription opioids    KNOW YOUR OPTIONS:  Talk to your health care provider about ways to manage your pain that do not involve prescription opioids. Some of these options may actually work better and have fewer risks and  side effects:    Pain relievers such as acetaminophen, ibuprofen, and naproxen    Some medications that are also used for depression or seizures    Physical therapy and exercise    Cognitive behavioral therapy, a psychological, goal-directed approach, in which patients learn how to modify physical, behavioral, and emotional triggers of pain and stress    IF YOU ARE PRESCRIBED OPIOIDS FOR PAIN:    Never take opioids in greater amounts or more often than prescribed    Follow up with your primary health care provider and work together to create a plan on how to manage your pain.    Talk about ways to help manage your pain that do not involve prescription opioids    Talk about all concerns and side effects    Help prevent misuse and abuse    Never sell or share prescription opioids    Never use another person's prescription opioids    Store prescription opioids in a secure place and out of reach of others (this may include visitors, children, friends, and family)    Visit www.cdc.gov/drugoverdose to learn about risks of opioid abuse and overdose    If you believe you may be struggling with addiction, tell your health care provider and ask for guidance or call [facility]'s National Helpline at 9-520-283-HELP    LEARN MORE / www.cdc.gov/drugoverdose/prescribing/guideline.html    Safely dispose of unused prescription opioids: Find your local drug take-back programs and more information about the importance of safe disposal at www.doseofreality.[place].gov             Medication List: This is a list of all your medications and when to take them. Check marks below indicate your daily home schedule. Keep this list as a reference.      Medications           Morning Afternoon Evening Bedtime As Needed    BUSPAR PO   Take 30 mg by mouth 2 times daily   Last time this was given:  30 mg on 2/1/2018  8:43 AM                                Calcium carb-Vitamin D 500 mg [place]-200 units 500-200 MG-UNIT per tablet   Commonly known as:  OSCAL  with D;Oyster Shell Calcium   Take 1 tablet by mouth 2 times daily (with meals)                                GABAPENTIN PO   Take 800 mg by mouth 4 times daily   Last time this was given:  800 mg on 2/1/2018  8:43 AM                                LEVOTHYROXINE SODIUM PO   Take 137 mcg by mouth daily   Last time this was given:  137 mcg on 2/1/2018  8:43 AM                                LISINOPRIL PO   Take 10 mg by mouth daily   Last time this was given:  10 mg on 1/31/2018  8:32 PM                                MAGNESIUM OXIDE PO   Take 500 mg by mouth daily                                oxyCODONE IR 5 MG tablet   Commonly known as:  ROXICODONE   Take 1-2 tablets (5-10 mg) by mouth every 6 hours as needed for moderate to severe pain   Last time this was given:  10 mg on 2/1/2018  9:31 AM                                PROMETHAZINE HCL RE   Place 25 mg rectally daily                                RANITIDINE HCL PO   Take 150 mg by mouth 2 times daily   Last time this was given:  150 mg on 2/1/2018  8:44 AM                                sucralfate 1 GM tablet   Commonly known as:  CARAFATE   Take by mouth 4 times daily                                TOPIRAMATE PO   Take 100 mg by mouth 3 times daily   Last time this was given:  100 mg on 2/1/2018  8:43 AM                                VITAMIN B-12 PO   Take 2,500 mcg by mouth daily

## 2018-01-29 NOTE — OR NURSING
BP's elevated around 160/80's-per Dr Antunez, BP should be less than 120. Orders for labetalol placed.

## 2018-01-29 NOTE — BRIEF OP NOTE
[facility], [place]    Brief Operative Note    Pre-operative diagnosis: Bariatric Surgery Status   Post-operative diagnosis Unwanted RNYGB, unhealing marginal ulcer  Procedure: Procedure(s):  Laparoscopic Reversal Of Aarno En Y Gastric Bypass, Hiatal Hernia Repair - Wound Class: III-Contaminated  Surgeon: Surgeon(s) and Role:     * Mango Gonzalez MD - Primary     * Shelby Mock MD - Resident - Assisting     * Fernando Warner MD - Resident - Assisting  Anesthesia: Combined General with Block   Estimated blood loss: 25 cc  Drains: None  Specimens:   ID Type Source Tests Collected by Time Destination   A : Aaron Limb Tissue Other SURGICAL PATHOLOGY EXAM Mango Gonzalez MD 1/29/2018 11:34 AM      Findings:   Small sized pouch, stapled anastomosis, common gastrotomy oversewn.  Complications: None.  Implants: None.

## 2018-01-29 NOTE — ANESTHESIA PREPROCEDURE EVALUATION
Anesthesia Evaluation     .             ROS/MED HX    ENT/Pulmonary:     (+)tobacco use, Past use , . .    Neurologic:     (+)seizures CVA     Cardiovascular:     (+) hypertension----. : . . . :. .       METS/Exercise Tolerance:     Hematologic:         Musculoskeletal:         GI/Hepatic: Comment: S/p RNY with revision   ulcer        Renal/Genitourinary:         Endo:     (+) thyroid problem hypothyroidism, .      Psychiatric:         Infectious Disease:         Malignancy:         Other:                                    Anesthesia Plan      History & Physical Review  History and physical reviewed and following examination; no interval change.    ASA Status:  3 .        Plan for General with Intravenous induction. Maintenance will be Inhalation.    PONV prophylaxis:  Ondansetron (or other 5HT-3)  Additional equipment: 2nd IV      Postoperative Care  Postoperative pain management:  Multi-modal analgesia and Peripheral nerve block (Single Shot).      Consents                          .

## 2018-01-30 LAB
ANION GAP SERPL CALCULATED.3IONS-SCNC: 9 MMOL/L (ref 3–14)
BUN SERPL-MCNC: 14 MG/DL (ref 7–30)
CALCIUM SERPL-MCNC: 8.5 MG/DL (ref 8.5–10.1)
CHLORIDE SERPL-SCNC: 110 MMOL/L (ref 94–109)
CO2 SERPL-SCNC: 24 MMOL/L (ref 20–32)
CREAT SERPL-MCNC: 0.82 MG/DL (ref 0.52–1.04)
ERYTHROCYTE [DISTWIDTH] IN BLOOD BY AUTOMATED COUNT: 15.1 % (ref 10–15)
GFR SERPL CREATININE-BSD FRML MDRD: 73 ML/MIN/1.7M2
GLUCOSE SERPL-MCNC: 97 MG/DL (ref 70–99)
HCT VFR BLD AUTO: 31.1 % (ref 35–47)
HGB BLD-MCNC: 9.1 G/DL (ref 11.7–15.7)
MAGNESIUM SERPL-MCNC: 1.7 MG/DL (ref 1.6–2.3)
MCH RBC QN AUTO: 29 PG (ref 26.5–33)
MCHC RBC AUTO-ENTMCNC: 29.3 G/DL (ref 31.5–36.5)
MCV RBC AUTO: 99 FL (ref 78–100)
PHOSPHATE SERPL-MCNC: 3.1 MG/DL (ref 2.5–4.5)
PLATELET # BLD AUTO: 240 10E9/L (ref 150–450)
POTASSIUM SERPL-SCNC: 3.8 MMOL/L (ref 3.4–5.3)
RBC # BLD AUTO: 3.14 10E12/L (ref 3.8–5.2)
SODIUM SERPL-SCNC: 143 MMOL/L (ref 133–144)
WBC # BLD AUTO: 9.2 10E9/L (ref 4–11)

## 2018-01-30 PROCEDURE — A9270 NON-COVERED ITEM OR SERVICE: HCPCS | Mod: GY | Performed by: STUDENT IN AN ORGANIZED HEALTH CARE EDUCATION/TRAINING PROGRAM

## 2018-01-30 PROCEDURE — 25000132 ZZH RX MED GY IP 250 OP 250 PS 637: Mod: GY | Performed by: STUDENT IN AN ORGANIZED HEALTH CARE EDUCATION/TRAINING PROGRAM

## 2018-01-30 PROCEDURE — 25800025 ZZH RX 258

## 2018-01-30 PROCEDURE — 25000128 H RX IP 250 OP 636: Performed by: STUDENT IN AN ORGANIZED HEALTH CARE EDUCATION/TRAINING PROGRAM

## 2018-01-30 PROCEDURE — 83735 ASSAY OF MAGNESIUM: CPT | Performed by: STUDENT IN AN ORGANIZED HEALTH CARE EDUCATION/TRAINING PROGRAM

## 2018-01-30 PROCEDURE — 36415 COLL VENOUS BLD VENIPUNCTURE: CPT | Performed by: STUDENT IN AN ORGANIZED HEALTH CARE EDUCATION/TRAINING PROGRAM

## 2018-01-30 PROCEDURE — 84100 ASSAY OF PHOSPHORUS: CPT | Performed by: STUDENT IN AN ORGANIZED HEALTH CARE EDUCATION/TRAINING PROGRAM

## 2018-01-30 PROCEDURE — 12000008 ZZH R&B INTERMEDIATE UMMC

## 2018-01-30 PROCEDURE — 85027 COMPLETE CBC AUTOMATED: CPT | Performed by: STUDENT IN AN ORGANIZED HEALTH CARE EDUCATION/TRAINING PROGRAM

## 2018-01-30 PROCEDURE — 25000125 ZZHC RX 250

## 2018-01-30 PROCEDURE — 80048 BASIC METABOLIC PNL TOTAL CA: CPT | Performed by: STUDENT IN AN ORGANIZED HEALTH CARE EDUCATION/TRAINING PROGRAM

## 2018-01-30 RX ORDER — LISINOPRIL 10 MG/1
10 TABLET ORAL DAILY
Status: DISCONTINUED | OUTPATIENT
Start: 2018-01-30 | End: 2018-02-01 | Stop reason: HOSPADM

## 2018-01-30 RX ORDER — DEXTROSE MONOHYDRATE, SODIUM CHLORIDE, AND POTASSIUM CHLORIDE 50; 1.49; 4.5 G/1000ML; G/1000ML; G/1000ML
INJECTION, SOLUTION INTRAVENOUS CONTINUOUS
Status: DISCONTINUED | OUTPATIENT
Start: 2018-01-30 | End: 2018-01-31

## 2018-01-30 RX ADMIN — POTASSIUM CHLORIDE, DEXTROSE MONOHYDRATE AND SODIUM CHLORIDE: 150; 5; 450 INJECTION, SOLUTION INTRAVENOUS at 23:22

## 2018-01-30 RX ADMIN — BUSPIRONE HYDROCHLORIDE 30 MG: 15 TABLET ORAL at 07:56

## 2018-01-30 RX ADMIN — GABAPENTIN 800 MG: 400 CAPSULE ORAL at 20:07

## 2018-01-30 RX ADMIN — ONDANSETRON 4 MG: 2 INJECTION INTRAMUSCULAR; INTRAVENOUS at 16:44

## 2018-01-30 RX ADMIN — HYDROMORPHONE HYDROCHLORIDE: 10 INJECTION, SOLUTION INTRAMUSCULAR; INTRAVENOUS; SUBCUTANEOUS at 05:41

## 2018-01-30 RX ADMIN — TOPIRAMATE 100 MG: 100 TABLET, FILM COATED ORAL at 20:08

## 2018-01-30 RX ADMIN — POTASSIUM CHLORIDE, DEXTROSE MONOHYDRATE AND SODIUM CHLORIDE: 150; 5; 450 INJECTION, SOLUTION INTRAVENOUS at 12:08

## 2018-01-30 RX ADMIN — Medication 2 G: at 12:09

## 2018-01-30 RX ADMIN — BUSPIRONE HYDROCHLORIDE 30 MG: 15 TABLET ORAL at 20:07

## 2018-01-30 RX ADMIN — LEVOTHYROXINE SODIUM 137 MCG: 25 TABLET ORAL at 07:56

## 2018-01-30 RX ADMIN — GABAPENTIN 800 MG: 400 CAPSULE ORAL at 16:41

## 2018-01-30 RX ADMIN — RANITIDINE 150 MG: 150 TABLET, FILM COATED ORAL at 20:07

## 2018-01-30 RX ADMIN — GABAPENTIN 800 MG: 400 CAPSULE ORAL at 11:45

## 2018-01-30 RX ADMIN — TRAZODONE HYDROCHLORIDE 150 MG: 100 TABLET ORAL at 21:18

## 2018-01-30 RX ADMIN — ONDANSETRON 4 MG: 2 INJECTION INTRAMUSCULAR; INTRAVENOUS at 05:06

## 2018-01-30 RX ADMIN — ONDANSETRON 4 MG: 2 INJECTION INTRAMUSCULAR; INTRAVENOUS at 23:22

## 2018-01-30 RX ADMIN — ENOXAPARIN SODIUM 40 MG: 40 INJECTION SUBCUTANEOUS at 08:01

## 2018-01-30 RX ADMIN — RANITIDINE 150 MG: 150 TABLET, FILM COATED ORAL at 07:56

## 2018-01-30 RX ADMIN — ACETAMINOPHEN 975 MG: 325 TABLET, FILM COATED ORAL at 07:56

## 2018-01-30 RX ADMIN — TOPIRAMATE 100 MG: 100 TABLET, FILM COATED ORAL at 07:56

## 2018-01-30 RX ADMIN — SODIUM CHLORIDE, POTASSIUM CHLORIDE, SODIUM LACTATE AND CALCIUM CHLORIDE: 600; 310; 30; 20 INJECTION, SOLUTION INTRAVENOUS at 00:47

## 2018-01-30 RX ADMIN — HYDROMORPHONE HYDROCHLORIDE: 10 INJECTION, SOLUTION INTRAMUSCULAR; INTRAVENOUS; SUBCUTANEOUS at 13:36

## 2018-01-30 RX ADMIN — TOPIRAMATE 100 MG: 100 TABLET, FILM COATED ORAL at 13:36

## 2018-01-30 RX ADMIN — ACETAMINOPHEN 975 MG: 325 TABLET, FILM COATED ORAL at 16:41

## 2018-01-30 RX ADMIN — GABAPENTIN 800 MG: 400 CAPSULE ORAL at 07:56

## 2018-01-30 RX ADMIN — SODIUM CHLORIDE, POTASSIUM CHLORIDE, SODIUM LACTATE AND CALCIUM CHLORIDE: 600; 310; 30; 20 INJECTION, SOLUTION INTRAVENOUS at 10:26

## 2018-01-30 NOTE — PLAN OF CARE
Problem: Patient Care Overview  Goal: Plan of Care/Patient Progress Review  Outcome: Improving  B/P: 104/54, T: 99.7, P: 68, R: 19  T max of 100.3 recheck of 99.7.  Patient states pain is controlled with Dilaudid PCA at 0.2 every 10 min.  Abdomen soft with hypoactive bowel sounds, patient states she isn't passing gas yet, and no BM today.  Abdominal incision and lap sites C/D/I.  Up independently walking in the hallway. Mag of 1.7 replaced with 2 g, recheck in the am.  Seizure pads in place. Mother at bedside.  Continue with POC.

## 2018-01-30 NOTE — PLAN OF CARE
Problem: Patient Care Overview  Goal: Plan of Care/Patient Progress Review  Pt is AxOx4. VSS. Pt admitted from PACU at 1700. Capno on. No issues. No oxygen needed. PCA set up for use. Seizure pads ordered.  left for hotel this evening. Will be available if needed. Bed alarm on. Mother Debora is here. Awaiting MD to see her. RN only assigned to her for two hours. No issues at this time.

## 2018-01-30 NOTE — OP NOTE
Procedure Date: 01/29/2018      PREOPERATIVE DIAGNOSIS:  Morbid obesity status post Aaron-en-Y gastric bypass with recalcitrant marginal ulcer.         POSTOPERATIVE DIAGNOSIS:  Morbid obesity status post Aaron-en-Y gastric bypass with recalcitrant marginal ulcer.         PROCEDURES:     1.  Laparoscopic reversal of Aaron-en-Y gastric bypass.     2.  Hiatal hernia repair.        SURGEON: Mango Gonzalez MD      ASSISTANT:     1.  Shelby Mock MD   2.  Fernando Warner MD      ANESTHESIA:  General.      ESTIMATED BLOOD LOSS:  25 mL       DRAINS:  None.      SPECIMENS:  Aaron limb.        FINDINGS:  Small sized pouch.  Aaron limb excised.  Stapled anastomosis of gastrogastrostomy with oversewn common enterotomy.  No evidence of leak with air insufflation and methylene blue instillation.        COMPLICATIONS:  None.       IMPLANTS:  None.        INDICATIONS FOR PROCEDURE:   Ms. Savanah Payton is a 52-year-old female who had had previous Aaron-en-Y gastric bypass.  The patient had a marginal ulcer in the past with perforation.  This was excised and repaired; however, she continued to smoke and redeveloped the marginal ulcer.  She quit smoking about 2 years ago but the ulcer has failed to heal despite maximum medical therapy.  She presents now for reversal of gastric bypass and excision of the ulcer.  The risks and benefits of the procedure were discussed with her, to which she agreed.  Risks include infection, bleeding, gastric staple line leak, small bowel staple line leak, pneumonia, MI, and other risks associated with anesthesia.        PROCEDURE IN DETAIL:  The patient was brought to the Operating Room and placed supine on the Operating Room table.  General endotracheal anesthesia was achieved.  Dumont catheter was inserted for adequate urinary monitoring.  The abdomen was then prepped and draped in usual sterile fashion.  We insufflated the abdomen via a Veress needle placed in the left upper quadrant.  We then placed a total  of 6 ports; two 5 mm ports in the left upper quadrant, one 12 mm port to the left of the umbilicus, 15 mm port to the right of the umbilicus, right upper quadrant 5 mm port and a lateral right-sided 5 mm port for the liver retractor.  We were able to elevate the liver using a liver retractor.   We surveyed the abdomen and planned our operation.  We then began our dissection to lyse adhesions of the gastric pouch to the liver.  We were able to dissect out the gastric remnant, gastric pouch and Aaron limb using a combination of blunt and electrocautery dissection.  After we had identified our anatomy fairly well we then dissected around the hiatus.  There was a small hiatal hernia defect.  We repaired this hiatal hernia defect using 2-0 Surgidac on an endostitch device using an interrupted U-stitch.  After we had done this Dr. Gonzalez went to the head of the bed and performed endoscopy, making sure the anatomy was well defined.  He then scrubbed back in and we were able to staple off the Aaron limb at the gastric pouch and excised the ulcer also.   We then paid attention to the jejunojejunostomy anastomosis.  Of note, there was a small jejunojejunal intussusception at the time of abdominal survey and this was reduced.  We measured out the Aaron limb which was about 60 cm.  We decided to excise the Aaron limb at this point and not redo the anastomosis.  Using a purple load stapler we excised the Aaron limb from the jejunojejunostomy anastomosis, ensuring that the jejunojejunostomy anastomosis stayed widely patent.  We used a combination of gray load staplers and Harmonic scalpel to dissect the Aaron limb off the mesentery.   Using an Endo Catch bag we removed the specimen from the field.  We then proceeded to place 2 interrupted 2-0 Surgidac anti-intussusception stitches at the confluence of the previous BP and common channel.  We again traced the bowel up to the BP limb and trace the bowel distal making sure the bowel  was in continuity and it was now one common channel.  There was a small mesenteric defect which we clipped to close shut.  After we had done this we paid attention to performing our gastrogastrostomy anastomosis.  The gastric remnant and small pouch lay side by side quite nicely.  We did not have to mobilize the short gastrics to get enough length.  We also made sure to preserve the blood supply to the gastric pouch via the left gastric artery.  We made gastrostomies on both the pouch and remnant and were able to fire a purple load stapler to create a common channel between both.  After we had done this Dr. Moon scrubbed out, went to the head of the head and performed another EGD.  He was able to pass the scope through both gastrostomies, through the pouch, and into the remnant.  He then scrubbed back in.  Using the endostitch device with 3-0 Polysorb we were able to oversew the common gastrostomy.  Of note, prior to performing our anastomosis we had placed a posterior layer of 2-0 Ethibond stitches to hold the stomach together.  As described, we used 3-0 Polysorb to close the common gastrostomy. Our first stitch was a Columbia suture.  We then oversewed the anastomosis using Lembert running sutures of 3-0 Polysorb.  After we were satisfied with the anastomosis Dr. Moon scrubbed out again, went to the head of the bed.  We performed under saline leak test with air and there were no bubbles.  He then instilled 250 mL of saline mixed with methylene blue into the stomach and observed again.  There was no leak of methylene blue laparoscopically.   After this was done we closed the 15 mm port using the PMI suture passer and 0 Vicryl stitch.  This ended our procedure.  We desufflated and closed the skin using staples.        ALL SPONGE AND NEEDLE COUNTS:  Correct.        The patient was extubated and taken to PACU for recovery.         KELLEE MOON MD       As dictated by JUAN ANTONIO BAILEY MD            D:  2018   T: 2018   MT: JOSE F      Name:     JOHN MONROY   MRN:      [id number]        Account:        [id number]   :      1965           Procedure Date: 2018      Document: [id number]       cc: Mango Gonzalez MD   I was present for the entire procedure. Did not have need of the Robot since we were sewing to the stomach pouch and remnant.

## 2018-01-30 NOTE — PROGRESS NOTES
BARIATRIC SURGERY PROGRESS NOTE     Patient: Savanah Payton  MRN: [id number]     Subjective  NAEO. Occasional moderate pain relieved by PCA. No N/V, no BM or flatus yet. Ambulating independently. Voiding independently in bathroom, had large unmeasured void.     Objective  Temp: 99  F (37.2  C) Temp  Min: 97.5  F (36.4  C)  Max: 99  F (37.2  C)  Resp: 19 Resp  Min: 15  Max: 19  SpO2: 96 % SpO2  Min: 92 %  Max: 100 %  Pulse: 68 Pulse  Min: 68  Max: 69  Heart Rate: 67 Heart Rate  Min: 62  Max: 87  BP: 104/54 Systolic (24hrs), Av , Min:101 , Max:165   Diastolic (24hrs), Av, Min:50, Max:87    I/O last 3 completed shifts:  In: 3075 [I.V.:2575]  Out: 752 [Urine 427; Blood 25] *some urine unrecorded      Gen: middle aged woman resting in bed in NAD. Pleasant, appropriate  CV: regular rate and rhythm   Pulm: respirations even and unlabored, saturating well on RA  Abd: soft, non-distended. Appropriately tender to palpation. 6 laparoscopic incision sites C/D/I with staples, minimal drainage.    Ext: WWP. Moves all four without apparent deficit     Labs:   Basic metabolic panel   Result Value Ref Range    Sodium 143 133 - 144 mmol/L    Potassium 3.8 3.4 - 5.3 mmol/L    Chloride 110 (H) 94 - 109 mmol/L    Carbon Dioxide 24 20 - 32 mmol/L    Anion Gap 9 3 - 14 mmol/L    Glucose 97 70 - 99 mg/dL    Urea Nitrogen 14 7 - 30 mg/dL    Creatinine 0.82 0.52 - 1.04 mg/dL    GFR Estimate 73 >60 mL/min/1.7m2    GFR Estimate If Black 88 >60 mL/min/1.7m2    Calcium 8.5 8.5 - 10.1 mg/dL   Magnesium   Result Value Ref Range    Magnesium 1.7 1.6 - 2.3 mg/dL   Phosphorus   Result Value Ref Range    Phosphorus 3.1 2.5 - 4.5 mg/dL   CBC with platelets   Result Value Ref Range    WBC 9.2 4.0 - 11.0 10e9/L    RBC Count 3.14 (L) 3.8 - 5.2 10e12/L    Hemoglobin 9.1 (L) 11.7 - 15.7 g/dL    Hematocrit 31.1 (L) 35.0 - 47.0 %    MCV 99 78 - 100 fl    MCH 29.0 26.5 - 33.0 pg    MCHC 29.3 (L) 31.5 - 36.5 g/dL    RDW 15.1 (H) 10.0 - 15.0 %     Platelet Count 240 150 - 450 10e9/L        Assessment & Plan  51 yo female POD 1 s/p RYGB reversal d/t marginal ulcers, hiatal hernia repair, and SALTY. Patient recovering appropriately from surgery.      Neuro:      - Pain: dilaudid PCA, PO tylenol scheduled     - PTA buspirone, gabapentin, topiramate, trazodone     - Pt with reported seizure hx, seizure precautions in place      CV:      - regular rate and rhythm, continue monitoring     - Resume PTA lisinopril       Pulm:      - encourage ambulation, IS      FEN/GI:      - ok for sips of clears     - IVF: switch to D5 1/2NS w/ 20 mEq K at 100 mL/hr     - Resume PTA Zantac      Renal:      -Continue strict I&Os, output appears to be adequate considering missed measurements    Endo:     - Resume PTA levothyroxine      Heme:     - monitor HgB     - Lovenox ppx      ID:     - Afebrile, no leukocytosis, no indications for abx      PPx: OOB, IS, SCDs, Lovenox   Dispo:7B    Angely Johnson MD  PGY-1 General Surgery     Pt seen by chief resident Shelby Mock and Dr. Gonzalez

## 2018-01-30 NOTE — PLAN OF CARE
"Blood pressure 101/50, pulse 69, temperature 98.7  F (37.1  C), temperature source Oral, resp. rate 18, height 1.626 m (5' 4\"), weight 85.2 kg (187 lb 13.3 oz), SpO2 96 %, not currently breastfeeding.    Pt afebrile, VSS. Has capnography with values within normal range.Complains of abd pain and tenderness has PCA 0.2mg/10min, with good relief. Complains of nausea, zofran administered x2 this shift with relief. Is NPO except meds. Has been up SBA to bathroom x2 this shift, voiding large amounts(*missed hat). Seizure pads in bed, no signs of active seizures or any changes in vision. Able to make needs known. Will continue with POC.   "

## 2018-01-31 VITALS
DIASTOLIC BLOOD PRESSURE: 63 MMHG | BODY MASS INDEX: 32.07 KG/M2 | HEART RATE: 72 BPM | OXYGEN SATURATION: 98 % | RESPIRATION RATE: 16 BRPM | WEIGHT: 187.83 LBS | HEIGHT: 64 IN | SYSTOLIC BLOOD PRESSURE: 104 MMHG | TEMPERATURE: 97.9 F

## 2018-01-31 LAB
COPATH REPORT: NORMAL
MAGNESIUM SERPL-MCNC: 2.1 MG/DL (ref 1.6–2.3)

## 2018-01-31 PROCEDURE — A9270 NON-COVERED ITEM OR SERVICE: HCPCS | Mod: GY | Performed by: STUDENT IN AN ORGANIZED HEALTH CARE EDUCATION/TRAINING PROGRAM

## 2018-01-31 PROCEDURE — 36415 COLL VENOUS BLD VENIPUNCTURE: CPT

## 2018-01-31 PROCEDURE — 25000132 ZZH RX MED GY IP 250 OP 250 PS 637: Mod: GY | Performed by: STUDENT IN AN ORGANIZED HEALTH CARE EDUCATION/TRAINING PROGRAM

## 2018-01-31 PROCEDURE — 25000128 H RX IP 250 OP 636: Performed by: STUDENT IN AN ORGANIZED HEALTH CARE EDUCATION/TRAINING PROGRAM

## 2018-01-31 PROCEDURE — 12000008 ZZH R&B INTERMEDIATE UMMC

## 2018-01-31 PROCEDURE — 83735 ASSAY OF MAGNESIUM: CPT

## 2018-01-31 RX ORDER — HYDROMORPHONE HYDROCHLORIDE 1 MG/ML
.3-.5 INJECTION, SOLUTION INTRAMUSCULAR; INTRAVENOUS; SUBCUTANEOUS
Status: DISCONTINUED | OUTPATIENT
Start: 2018-01-31 | End: 2018-02-01 | Stop reason: HOSPADM

## 2018-01-31 RX ORDER — OXYCODONE HYDROCHLORIDE 5 MG/1
5-10 TABLET ORAL EVERY 4 HOURS PRN
Status: DISCONTINUED | OUTPATIENT
Start: 2018-01-31 | End: 2018-02-01 | Stop reason: HOSPADM

## 2018-01-31 RX ADMIN — TRAZODONE HYDROCHLORIDE 150 MG: 100 TABLET ORAL at 22:00

## 2018-01-31 RX ADMIN — TOPIRAMATE 100 MG: 100 TABLET, FILM COATED ORAL at 13:55

## 2018-01-31 RX ADMIN — HYDROMORPHONE HYDROCHLORIDE: 10 INJECTION, SOLUTION INTRAMUSCULAR; INTRAVENOUS; SUBCUTANEOUS at 05:46

## 2018-01-31 RX ADMIN — ACETAMINOPHEN 975 MG: 325 TABLET, FILM COATED ORAL at 23:32

## 2018-01-31 RX ADMIN — GABAPENTIN 800 MG: 400 CAPSULE ORAL at 20:31

## 2018-01-31 RX ADMIN — OXYCODONE HYDROCHLORIDE 10 MG: 5 TABLET ORAL at 20:41

## 2018-01-31 RX ADMIN — GABAPENTIN 800 MG: 400 CAPSULE ORAL at 12:11

## 2018-01-31 RX ADMIN — OXYCODONE HYDROCHLORIDE 10 MG: 5 TABLET ORAL at 10:08

## 2018-01-31 RX ADMIN — TOPIRAMATE 100 MG: 100 TABLET, FILM COATED ORAL at 20:32

## 2018-01-31 RX ADMIN — RANITIDINE 150 MG: 150 TABLET, FILM COATED ORAL at 20:32

## 2018-01-31 RX ADMIN — OXYCODONE HYDROCHLORIDE 10 MG: 5 TABLET ORAL at 15:45

## 2018-01-31 RX ADMIN — ACETAMINOPHEN 975 MG: 325 TABLET, FILM COATED ORAL at 15:41

## 2018-01-31 RX ADMIN — RANITIDINE 150 MG: 150 TABLET, FILM COATED ORAL at 08:20

## 2018-01-31 RX ADMIN — TOPIRAMATE 100 MG: 100 TABLET, FILM COATED ORAL at 08:19

## 2018-01-31 RX ADMIN — BUSPIRONE HYDROCHLORIDE 30 MG: 15 TABLET ORAL at 08:20

## 2018-01-31 RX ADMIN — BUSPIRONE HYDROCHLORIDE 30 MG: 15 TABLET ORAL at 20:31

## 2018-01-31 RX ADMIN — ACETAMINOPHEN 975 MG: 325 TABLET, FILM COATED ORAL at 00:31

## 2018-01-31 RX ADMIN — ONDANSETRON 4 MG: 2 INJECTION INTRAMUSCULAR; INTRAVENOUS at 16:34

## 2018-01-31 RX ADMIN — GABAPENTIN 800 MG: 400 CAPSULE ORAL at 15:41

## 2018-01-31 RX ADMIN — LEVOTHYROXINE SODIUM 137 MCG: 25 TABLET ORAL at 08:20

## 2018-01-31 RX ADMIN — LISINOPRIL 10 MG: 10 TABLET ORAL at 20:32

## 2018-01-31 RX ADMIN — ENOXAPARIN SODIUM 40 MG: 40 INJECTION SUBCUTANEOUS at 08:19

## 2018-01-31 RX ADMIN — GABAPENTIN 800 MG: 400 CAPSULE ORAL at 08:20

## 2018-01-31 RX ADMIN — ACETAMINOPHEN 975 MG: 325 TABLET, FILM COATED ORAL at 08:20

## 2018-01-31 NOTE — PLAN OF CARE
Problem: Patient Care Overview  Goal: Plan of Care/Patient Progress Review  Outcome: Improving  tmax 100F. BP soft this evening, lisinopril held. Lap sites covered with primapore. Intermittent c/o pain to abd and nausea. PCA 0.2:10, MIVF at 100ml/hr. Prn zofran with relief. Using IS independently. NPO except a few ice chips and sips for meds. Up ind ambulating in halls. Voiding good amounts of yellow urine in hat.

## 2018-01-31 NOTE — PROGRESS NOTES
"Surgery progress note    Tolerating sips. Low grade temps overnight. Pain controlled.     /59 (BP Location: Left arm)  Pulse 72  Temp 99  F (37.2  C) (Oral)  Resp 16  Ht 1.626 m (5' 4\")  Wt 85.2 kg (187 lb 13.3 oz)  SpO2 97%  BMI 32.24 kg/m2    NAD  NLB  Abd soft, appropriate. Incisions clean and dry with Steri strips in place.   Ext wwp    52 F POD 2 s/p reversal of RNYGB     Advance to clears today   Plan to discharge home on clears for about 7 days, then pureed foods after that for 2-3 weeks.   PO and IV PRN analgesics   Lovenox ppx     Seen with Dr Carlos Mock MD  Surgery Resident  [phone]    "

## 2018-01-31 NOTE — PLAN OF CARE
Problem: Patient Care Overview  Goal: Plan of Care/Patient Progress Review  Outcome: No Change  Vital signs:  Temp: 99.4  F (37.4  C) Temp src: Oral BP: 113/61 Pulse: 72 Heart Rate: 71 Resp: 18 SpO2: 96 % O2 Device: None (Room air)     VSS on RA. Tmax 100.2, scheduled tylenol given, temp recheck 99.4. PIV in left infusing D5 1/2NS+20KCl @ 100mL/hr. PCA dilaudid @ 0.2 for pain control. 6 lap sites covered with primapore. +BS, pt reports not passing flatus. Voiding adequate amounts. Denies nausea. NPO except ice chips and sips of water. Sleeping between cares.

## 2018-01-31 NOTE — PLAN OF CARE
Problem: Patient Care Overview  Goal: Plan of Care/Patient Progress Review  Outcome: Improving  B/P: 111/59, T: 98.7, P: 72, R: 16  T max 99.0 and recheck 98.7.  Patient states pain is tolerable with Oxycodone x 1. Abdomen soft with hypoactive bowel sounds, patient reports not passing gas and no BM yet.  Lap sites intact with steri strips.  Tolerating bariatric clear liquid diet. Voiding adequate amounts of urine. Up independently walking in hallway.  Mother at bedside.  Continue with POC.

## 2018-02-01 LAB — PLATELET # BLD AUTO: 207 10E9/L (ref 150–450)

## 2018-02-01 PROCEDURE — 25000132 ZZH RX MED GY IP 250 OP 250 PS 637: Mod: GY | Performed by: STUDENT IN AN ORGANIZED HEALTH CARE EDUCATION/TRAINING PROGRAM

## 2018-02-01 PROCEDURE — A9270 NON-COVERED ITEM OR SERVICE: HCPCS | Mod: GY | Performed by: STUDENT IN AN ORGANIZED HEALTH CARE EDUCATION/TRAINING PROGRAM

## 2018-02-01 PROCEDURE — 25000128 H RX IP 250 OP 636: Performed by: STUDENT IN AN ORGANIZED HEALTH CARE EDUCATION/TRAINING PROGRAM

## 2018-02-01 PROCEDURE — 36415 COLL VENOUS BLD VENIPUNCTURE: CPT | Performed by: STUDENT IN AN ORGANIZED HEALTH CARE EDUCATION/TRAINING PROGRAM

## 2018-02-01 PROCEDURE — 85049 AUTOMATED PLATELET COUNT: CPT | Performed by: STUDENT IN AN ORGANIZED HEALTH CARE EDUCATION/TRAINING PROGRAM

## 2018-02-01 RX ORDER — OXYCODONE HYDROCHLORIDE 5 MG/1
5-10 TABLET ORAL EVERY 6 HOURS PRN
Qty: 20 TABLET | Refills: 0 | Status: SHIPPED | OUTPATIENT
Start: 2018-02-01 | End: 2018-02-06

## 2018-02-01 RX ADMIN — LEVOTHYROXINE SODIUM 137 MCG: 25 TABLET ORAL at 08:43

## 2018-02-01 RX ADMIN — BUSPIRONE HYDROCHLORIDE 30 MG: 15 TABLET ORAL at 08:43

## 2018-02-01 RX ADMIN — OXYCODONE HYDROCHLORIDE 10 MG: 5 TABLET ORAL at 09:31

## 2018-02-01 RX ADMIN — GABAPENTIN 800 MG: 400 CAPSULE ORAL at 08:43

## 2018-02-01 RX ADMIN — RANITIDINE 150 MG: 150 TABLET, FILM COATED ORAL at 08:44

## 2018-02-01 RX ADMIN — OXYCODONE HYDROCHLORIDE 10 MG: 5 TABLET ORAL at 05:29

## 2018-02-01 RX ADMIN — ENOXAPARIN SODIUM 40 MG: 40 INJECTION SUBCUTANEOUS at 08:44

## 2018-02-01 RX ADMIN — ACETAMINOPHEN 975 MG: 325 TABLET, FILM COATED ORAL at 08:42

## 2018-02-01 RX ADMIN — TOPIRAMATE 100 MG: 100 TABLET, FILM COATED ORAL at 08:43

## 2018-02-01 RX ADMIN — Medication 0.5 MG: at 06:27

## 2018-02-01 NOTE — PROGRESS NOTES
Doing well  Temp: 97.9  F (36.6  C) Temp src: Oral BP: 104/63   Heart Rate: 70 Resp: 16 SpO2: 98 % O2 Device: None (Room air)    Tolerating clears  OK for dc

## 2018-02-01 NOTE — PLAN OF CARE
VSS on RA. Afebrile. Pain controlled with oxycodone. Tolerating bariatric clears. Zofran given x1 with relief. Lap sites with steri strips CDI. +BS, not passing gas yet. Up independently. Adequate UOP. Continue with poc.

## 2018-02-01 NOTE — DISCHARGE SUMMARY
"[facility] Discharge Summary    Savanah Payton MRN: [id number]   YOB: 1965 Age: 52 year old     Date of Admission:  1/29/2018  Date of Discharge::  2/1/2018 10:47 AM  Admitting Physician:  Mango Gonzalez MD  Discharge Physician:  Mango Gonzalez MD  Primary Care Physician:         System, Provider Not In          Admission Diagnoses:   Bariatric Surgery Status   Complications of gastric bypass surgery          Discharge Diagnosis:   Same          Procedures:   1/30/18 -   1.  Laparoscopic reversal of Aaron-en-Y gastric bypass.     2.  Hiatal hernia repair.          Non-operative procedures:   None performed          Consultations:   None          Brief History of Illness:   Ms Savanah Payton is a 53 yo female s/p RYGB in 2006 ([place], [place]) and laparoscopic revision for marginal ulcers in 2011, here for EGD follow up and evaluation for RYGB reversal. Has had epigastric pain for the past year and had this pain before her revision. Pain is associated with nausea and vomiting. Since last visit she says she is doing \"horribly\". Takes Prilosec, even though it does not help her, knowing that it gives her explosive diarrhea. Also takes Zantac once a day and carafate 4x a day. \"I am at the end of my rope\". The pain is so bad that she is not sleeping and it is progressively getting worse. Pt is still smoking. Cannot eat or drink from the pain. Most recent EGD (9/26/2017) showed a deep ulcer without visible vessel and a small hiatal hernia. Biopsy at the GE junction (9/26/2017) indicated mild chronic inflammation in the gastric cardiac-type mucosa. It was negative for H. Pylori and intestinal metaplasia or dysplasia.            Hospital Course:   The patient underwent the above operation on 1/30/18, which she tolerated well without complications. She was transferred to the floor for routine postoperative care and the remainder of her hospitalization was unremarkable. Diet was advanced to clear liquids as " expected which she tolerated well.     At the time of discharge, she was tolerating clear liquid diet, ambulating, voiding spontaneously without difficulty, and pain was controlled with oral pain medications. The patient was discharged home in stable and improved condition. She will follow up in clinic with Dr. Gonzalez in one week.         Final Pathology Result:   Pending at time of discharge         Medications Prior to Admission:     No prescriptions prior to admission.            Discharge Medications:     Discharge Medication List as of 2/1/2018 10:21 AM      START taking these medications    Details   oxyCODONE IR (ROXICODONE) 5 MG tablet Take 1-2 tablets (5-10 mg) by mouth every 6 hours as needed for moderate to severe pain, Disp-20 tablet, R-0, Local Print         CONTINUE these medications which have NOT CHANGED    Details   sucralfate (CARAFATE) 1 GM tablet Take by mouth 4 times daily, Historical      Cyanocobalamin (VITAMIN B-12 PO) Take 2,500 mcg by mouth daily, Historical      LEVOTHYROXINE SODIUM PO Take 137 mcg by mouth daily , Historical      BusPIRone HCl (BUSPAR PO) Take 30 mg by mouth 2 times daily , Historical      MAGNESIUM OXIDE PO Take 500 mg by mouth daily, Historical      GABAPENTIN PO Take 800 mg by mouth 4 times daily, Historical      RANITIDINE HCL PO Take 150 mg by mouth 2 times daily, Historical      TOPIRAMATE PO Take 100 mg by mouth 3 times daily, Historical      LISINOPRIL PO Take 10 mg by mouth daily, Historical      calcium carb 1250 mg, 500 mg [place],/vitamin D 200 units (OSCAL WITH D) 500-200 MG-UNIT per tablet Take 1 tablet by mouth 2 times daily (with meals), Historical      PROMETHAZINE HCL RE Place 25 mg rectally daily, Historical                  Day of Discharge Physical Exam:   Temp:  [97.9  F (36.6  C)] 97.9  F (36.6  C)  Heart Rate:  [60-70] 70  Resp:  [16] 16  BP: ()/(52-63) 104/63  SpO2:  [98 %-99 %] 98 %  General: AAOx4, NAD, lying comfortably in bed  CV/Pulm:  RRR, no dyspnea, NLB on RA  Abd: soft, non-distended, appropriately tender, incisions C/D/I  Extremities: WWP  Neuro: moving all extremities spontaneously          Discharge Instructions and Follow-Up:     Reason for your hospital stay   Ms. Payton was admitted for a planned operation to reverse her gerard-en-y gastric bypass due to marginal ulcers     Adult [facility] Follow-up and recommended labs and tests   Follow up with Dr. Gonzalez in one week    Appointments on [place] and/or [facility] (with [facility] or [facility] provider or service). Call [phone] if you haven't heard regarding these appointments within 7 days of discharge.     Activity   Your activity upon discharge: activity as tolerated. No strenuous exercise for 4-6 weeks after surgery. Please do not drive if you are taking narcotic pain medications.     Discharge Instructions   No lifting >10 pounds for 4-6 weeks. Discuss with your surgeon at follow up appointment  May shower starting postoperative day #1 but no scrubbing incisions. No bathing or soaking incisions for 2 weeks or until incisions completely healed.  Wound care: Keep clean and dry. Steri strips or glue will fall off on their own.   After surgery it is ok to swallow medications smaller than 1/4 inch(size of pencil eraser) for all procedures.  If medication is larger than 1/4 inch then it will need to be crushed, cut or in liquid form for 1-2 months after surgery. This can be discussed with surgeon team at the 1 month follow up appointment and will depend on patient tolerance.  Follow-up with your surgeon team in 1-2 weeks. If this appointment was not previous made for you please call [phone] and choose option #1 to schedule your follow-up appointment.   You will receive a call from our clinic nurse after surgery.  If you have any questions and would like to speak with a nurse please call [phone] and choose option #3 to speak with a triage nurse.  Call [phone] and ask to  speak with surgery resident if you are having troubles in the evenings, at night, or on weekends. Please call if you experience increasing abdominal pain, nausea, vomiting, increasing drainage from your wounds, chills, or fever >101.5  Take stool softener while taking narcotic pain medication.  No driving for at least 12 hours after taking narcotic pain medication.  Appointments located at [facility] clinic:  [address] [place]  [place] [address]     Diet   Follow this diet upon discharge: clear liquid diet for one week    Plan to advance to pureed foods thereafter; will be discussed with Dr. Gonzalez             Home Health Care:   Not needed               Discharge Disposition:   Discharged to home      Condition at discharge: Stable      Patient discussed with staff on day of discharge.    Fernando Warner MD  PGY-2 General Surgery

## 2018-02-01 NOTE — PLAN OF CARE
Problem: Patient Care Overview  Goal: Plan of Care/Patient Progress Review  Outcome: No Change  Vital signs:  Temp: 97.9  F (36.6  C) Temp src: Oral BP: 104/63   Heart Rate: 70 Resp: 16 SpO2: 98 % O2 Device: None (Room air)     VSS on RA. PIV in left SL. 6 lap sites with steristrips, scant drainage. Pt c/o pain in abdomen, oxy given x1 with no relief, dilaudid given x1 with relief. +BS, pt reports passing flatus (started around 2300), no BM overnight. Voided 900 mL. Denies nausea. On bariatric clear liquid diet. Sleeping most of night.

## 2018-02-02 ENCOUNTER — CARE COORDINATION (OUTPATIENT)
Dept: CARE COORDINATION | Facility: CLINIC | Age: 53
End: 2018-02-02

## 2018-02-02 ENCOUNTER — CARE COORDINATION (OUTPATIENT)
Dept: SURGERY | Facility: CLINIC | Age: 53
End: 2018-02-02

## 2018-02-02 NOTE — PROGRESS NOTES
RN Post Op Care Coordination Note    Ms. Savanah Payton is a 52 year old female who underwent Laparoscopic Reversal Of Aaron En Y Gastric Bypass, Hiatal Hernia Repair on 01/29/2018 with Dr. Gonzalez for a history of Morbid obesity status post Aaron-en-Y gastric bypass with recalcitrant marginal ulcer.  Spoke with Patient.    Support  Patient able to care for self independently     Health Status  Fevers/chills: Patient denies any fever or chills.  Nausea/Vomiting: Patient denies nausea/vomiting.  Eating/drinking: Patient is only able to drink liquids.  Bowel habits: Patient reports no bowel movement since surgery. Passing gas.  Last BM: morning of surgery. Advised patient she should take Miralax, she states she has Dulcolax tabs at home and will take one. Advised patient to be sure she stays hydrated if she develops diarrhea.   Urinary: Voiding normally Symptoms of UTI: non  Drains (LISET): N/A  Incisions: Patient denies any signs and symptoms of infection. and Patient denies any drainage.  Vascular Assessment: Patient reports good color motion sensitivity with no numbness or tingling.  Pain: Patient reports pain as a 5 out of 10 without medication.  The pain is located Upper abdominal . Pain is managed with Narcotics  Ibuprophen/Tylenol  Ice/Heat. Advised patient to start using ice to areas of discomfort.    Advised patient to take ES Tylenol 2 tablets every 6 hours while symptoms last, not to exceed 6 caplets in 24 hours.     Activity/Restrictions  Patient following lifting restrictions. and Following restrictions outlined by surgeon.    Whom and When to Call  Patient acknowledges understanding of how to manage any medication changes and when to seek medical care.     Patient advised that if after hour medical concerns arise to please call [phone] and choose option 4 to speak to the physician on call.       Follow up appointment scheduled for 02/07/2018 with Dr. Gonzalez.    Patient appreciated phone call.

## 2018-02-07 ENCOUNTER — OFFICE VISIT (OUTPATIENT)
Dept: SURGERY | Facility: CLINIC | Age: 53
End: 2018-02-07
Payer: COMMERCIAL

## 2018-02-07 VITALS
OXYGEN SATURATION: 100 % | DIASTOLIC BLOOD PRESSURE: 64 MMHG | HEIGHT: 64 IN | HEART RATE: 59 BPM | WEIGHT: 184.2 LBS | SYSTOLIC BLOOD PRESSURE: 114 MMHG | TEMPERATURE: 98.2 F | BODY MASS INDEX: 31.45 KG/M2

## 2018-02-07 DIAGNOSIS — E65 ABDOMINAL PANNUS: Primary | ICD-10-CM

## 2018-02-07 NOTE — NURSING NOTE
"Chief Complaint   Patient presents with     RECHECK     F/U OR 1/29/18        Vitals:    02/07/18 1050   BP: 114/64   Pulse: 59   Temp: 98.2  F (36.8  C)   TempSrc: Oral   SpO2: 100%   Weight: 184 lb 3.2 oz   Height: 5' 4\"       Body mass index is 31.62 kg/(m^2).  Chandrakant Everett CMA                          "

## 2018-02-07 NOTE — LETTER
"2/7/2018       RE: Savanah Payton  [address]  [address]   [facility] [address]     Dear Colleague,    Thank you for referring your patient, Savanah Payton, to the [facility] SURGICAL WEIGHT MANAGEMENT at [facility]. Please see a copy of my visit note below.    Patient underwent prior gastric bypass reversal surgery and this occurred 1.4 weeks ago.    Savanah Payton overall has the following complaints: Doing well, not much complaints, minor opening of right sided incision.     On exam:  /64  Pulse 59  Temp 98.2  F (36.8  C) (Oral)  Ht 1.626 m (5' 4\")  Wt 83.6 kg (184 lb 3.2 oz)  SpO2 100%  BMI 31.62 kg/m2  Lung exam: breathing unlabored  Abdominal exam: soft; nontender; nondistended; incisions c/d/i except right 12 mm port with minor fibrin tissue.     Plan:  Advance to pureed diet, advance further in 2-3 weeks if tolerating  Referral to plastic surgery to discuss panniculectomy   Patient to follow up with Dr Gonzalez in 4 weeks    Seen with Dr Carlos Mock MD  Surgery Resident    I saw and evaluated the patient. I agree with the findings and the plan of care as documented in the resident s note.    Mango Gonzalez MD      Again, thank you for allowing me to participate in the care of your patient.      Sincerely,    Mangoilsa Gonzalez MD      "

## 2018-02-07 NOTE — LETTER
Date:February 8, 2018      Patient was self referred, no letter generated. Do not send.        [facility] Health Information

## 2018-02-07 NOTE — MR AVS SNAPSHOT
After Visit Summary   2/7/2018    Savanah Payton    MRN: [id number]           Patient Information     Date Of Birth          1965        Visit Information        Provider Department      2/7/2018 11:00 AM Magno Gonzalez MD [facility] Surgical Weight Management        Today's Diagnoses     Abdominal pannus    -  1       Follow-ups after your visit        Additional Services     PLASTIC SURGERY REFERRAL       Your provider has referred you to: [facility]: Plastic and Reconstructive Surgery [facility] [phone]   [email or web address]    Please be aware that coverage of these services is subject to the terms and limitations of your health insurance plan.  Call member services at your health plan with any benefit or coverage questions.      Please bring the following with you to your appointment:    (1) Any X-Rays, CTs or MRIs which have been performed.  Contact the facility where they were done to arrange for  prior to your scheduled appointment.    (2) List of current medications  (3) This referral request   (4) Any documents/labs given to you for this referral    Patient wants to discuss panniculectomy                  Your next 10 appointments already scheduled     Mar 07, 2018 11:00 AM CST   (Arrive by 10:45 AM)   NUTRITION VISIT with Radha Orozco RD   [facility] Surgical Weight Management ([facility])    [address] [place] [id number] [phone]            Mar 07, 2018 11:45 AM CST   (Arrive by 11:30 AM)   RETURN BARIATRIC SURGERY with Mango Gonzalez MD   [facility] Surgical Weight Management ([facility])    [address] [place] [id number] [phone]              Who to contact     Please call your clinic at [phone] to:    Ask questions about your health    Make or cancel appointments    Discuss  "your medicines    Learn about your test results    Speak to your doctor   If you have compliments or concerns about an experience at your clinic, or if you wish to file a complaint, please contact [facility] Patient Relations at [phone] or email us at [email or web address]         Additional Information About Your Visit        [employer] Information     [employer] gives you secure access to your electronic health record. If you see a primary care provider, you can also send messages to your care team and make appointments. If you have questions, please call your primary care clinic.  If you do not have a primary care provider, please call [phone] and they will assist you.      [employer] is an electronic gateway that provides easy, online access to your medical records. With [employer], you can request a clinic appointment, read your test results, renew a prescription or communicate with your care team.     To access your existing account, please contact your [facility] or call [phone] for assistance.        Care EveryWhere ID     This is your Care EveryWhere ID. This could be used by other organizations to access your [place] medical records  [id number]        Your Vitals Were     Pulse Temperature Height Pulse Oximetry BMI (Body Mass Index)       59 98.2  F (36.8  C) (Oral) 5' 4\" 100% 31.62 kg/m2        Blood Pressure from Last 3 Encounters:   02/07/18 114/64   01/31/18 104/63   01/17/18 134/74    Weight from Last 3 Encounters:   02/07/18 184 lb 3.2 oz   01/29/18 187 lb 13.3 oz   01/17/18 192 lb 9.6 oz              We Performed the Following     PLASTIC SURGERY REFERRAL          Today's Medication Changes          These changes are accurate as of 2/7/18  3:51 PM.  If you have any questions, ask your nurse or doctor.               Stop taking these medicines if you haven't already. Please contact your care team if you have questions.     " sucralfate 1 GM tablet   Commonly known as:  CARAFATE   Stopped by:  Mango Gonzalez MD                    Primary Care Provider Fax #    Provider Not In System [phone]                Equal Access to Services     VARSHA YUJACQUI : Hadii aad ku haddesmondjs Angali, wajulyda lumaria dolores, qagiovannita kakindra madsen, david piotrin hayaajanessa greeneguzman khalillayo león. So [facility] [phone].    ATENCIÓN: Si habla español, tiene a kearns disposición servicios gratuitos de asistencia lingüística. Llame al [phone].    We comply with applicable federal civil rights laws and [place] laws. We do not discriminate on the basis of race, color, national origin, age, disability, sex, sexual orientation, or gender identity.            Thank you!     Thank you for choosing [facility] SURGICAL WEIGHT MANAGEMENT  for your care. Our goal is always to provide you with excellent care. Hearing back from our patients is one way we can continue to improve our services. Please take a few minutes to complete the written survey that you may receive in the mail after your visit with us. Thank you!             Your Updated Medication List - Protect others around you: Learn how to safely use, store and throw away your medicines at www.disposemymeds.org.          This list is accurate as of 2/7/18  3:51 PM.  Always use your most recent med list.                   Brand Name Dispense Instructions for use Diagnosis    BUSPAR PO      Take 30 mg by mouth 2 times daily        Calcium carb-Vitamin D 500 mg [place]-200 units 500-200 MG-UNIT per tablet    OSCAL with D;Oyster Shell Calcium     Take 1 tablet by mouth 2 times daily (with meals)        GABAPENTIN PO      Take 800 mg by mouth 4 times daily        LEVOTHYROXINE SODIUM PO      Take 137 mcg by mouth daily        LISINOPRIL PO      Take 10 mg by mouth daily        MAGNESIUM OXIDE PO      Take 500 mg by mouth daily        PROMETHAZINE HCL RE      Place 25 mg rectally daily        RANITIDINE HCL PO      Take 150 mg  by mouth 2 times daily        TOPIRAMATE PO      Take 100 mg by mouth 3 times daily        VITAMIN B-12 PO      Take 2,500 mcg by mouth daily

## 2018-02-07 NOTE — PROGRESS NOTES
"Patient underwent prior gastric bypass reversal surgery and this occurred 1.4 weeks ago.    Savanah Payton overall has the following complaints: Doing well, not much complaints, minor opening of right sided incision.     On exam:  /64  Pulse 59  Temp 98.2  F (36.8  C) (Oral)  Ht 1.626 m (5' 4\")  Wt 83.6 kg (184 lb 3.2 oz)  SpO2 100%  BMI 31.62 kg/m2  Lung exam: breathing unlabored  Abdominal exam: soft; nontender; nondistended; incisions c/d/i except right 12 mm port with minor fibrin tissue.     Plan:  Advance to pureed diet, advance further in 2-3 weeks if tolerating  Referral to plastic surgery to discuss panniculectomy   Patient to follow up with Dr Gonzalez in 4 weeks    Seen with Dr Carlos Mock MD  Surgery Resident    I saw and evaluated the patient. I agree with the findings and the plan of care as documented in the resident s note.    Mangoilsa Gonzalez MD    "

## 2018-02-25 ENCOUNTER — TELEPHONE (OUTPATIENT)
Dept: GASTROENTEROLOGY | Facility: CLINIC | Age: 53
End: 2018-02-25

## 2018-02-25 NOTE — TELEPHONE ENCOUNTER
On-Call Gastroenterology Fellow Note  02/25/18    Received call via  from patient. Patient is three weeks post-op from reversal of Aaron En Y Gastric Bypass surgery. Not followed by gastroenterology.    Patient reports she is having right sided abdominal pain, decreased oral intake and low blood pressure readings this morning and some dizziness while she was at [facility]. She reports an initial blood pressure of 60/30 and second of 70/40.     I advised her I was unable to make post-op recommendations as I was not the bariatric surgery team (unclear how patient was transferred to my pager), but that with her symptoms she needs to either call 911 or have someone drive her to the nearest emergency department as soon as possible for evaluation.     Patient expressed understanding.     Please contact surgical service for further concerns regarding this patient.     Chantel Bains MD  Gastroenterology Fellow

## 2018-03-07 ENCOUNTER — ALLIED HEALTH/NURSE VISIT (OUTPATIENT)
Dept: SURGERY | Facility: CLINIC | Age: 53
End: 2018-03-07
Payer: COMMERCIAL

## 2018-03-07 ENCOUNTER — OFFICE VISIT (OUTPATIENT)
Dept: SURGERY | Facility: CLINIC | Age: 53
End: 2018-03-07
Payer: COMMERCIAL

## 2018-03-07 VITALS
HEIGHT: 64 IN | TEMPERATURE: 98.6 F | WEIGHT: 177.1 LBS | SYSTOLIC BLOOD PRESSURE: 103 MMHG | BODY MASS INDEX: 30.23 KG/M2 | DIASTOLIC BLOOD PRESSURE: 62 MMHG | HEART RATE: 85 BPM | OXYGEN SATURATION: 100 %

## 2018-03-07 DIAGNOSIS — G40.909 SEIZURE DISORDER (H): Primary | ICD-10-CM

## 2018-03-07 ASSESSMENT — PAIN SCALES - GENERAL: PAINLEVEL: NO PAIN (0)

## 2018-03-07 NOTE — PATIENT INSTRUCTIONS
It was a pleasure meeting with you today.     Thank you for allowing us the privilege of caring for you. We hope we provided you with the excellent service you deserve.     Please let us know if there is anything else we can do for you so that we can be sure you are leaving completely satisfied with your care experience.      You saw Dr Gonzalez today.     Instructions per today's visit:     No follow up is needed with Dr. Gonzalez.    Neurology referral is entered.    To schedule appointments with our team, please call [phone] option #1    Please call during clinic hours Monday through Friday 8:00a - 4:00p if you have questions or you can contact us via Webs at anytime.      Nurses: [phone] Option # 3 for nurse advice line.  Fax: [phone]  Surgery Scheduler: [phone]    Please call the hospital at [phone] to speak with our on call MDs if you have urgent needs after hours, during weekends, or holidays.

## 2018-03-07 NOTE — PROGRESS NOTES
"Nutrition Assessment  Reason For Visit:  Savanah Payton is a 52 year old presenting today for nutrition consult 5 weeks s/p laparoscopic reversal of Aaron en y gastric bypass, initial gastric bypass preformed in 2006.  Patient referred by Dr. Gonzalez(3/7/2018).    Anthropometrics:  Estimated body mass index is 30.4 kg/(m^2) as calculated from the following:    Height as of an earlier encounter on 3/7/18: 1.626 m (5' 4\").    Weight as of an earlier encounter on 3/7/18: 80.3 kg (177 lb 1.6 oz).  Highest Weight before surgery: 314 lbs  Lowest Weight since surgery: 150 lbs    Nutrition History  Recall Diet:  Protein shake, eggs, pureed hamburger  Pureed foods started on 2/7/18.  Portions are ~1 cup  Beverages: coffee(once per week) water    SUPPLEMENT INFORMATION:  Thrive - B vitamins, riboflavin, thiamine, folate, vitamin D    Physical Activity Reviewed With Patient 3/5/2018   How often do you exercise? 1 to 2 times per week   What is the duration of your exercise (in minutes)? 20 Minutes   What types of exercise do you do? walking   What keeps you from being more active?  I am as active as I can possbily be       Nutrition Diagnosis  Obesity r/t long history of self-monitoring deficit and excessive energy intake aeb BMI >30.    Intervention  Materials/Education provided on bariatric soft and regular consistency diets, protein intake, fluid intake, eating pace, chewing foods well, portion control, sugar/fat intake, recommended vitamin/mineral supplements.     Patient Understanding: good  Expected Compliance: fair/good      GOALS:  1) Follow soft diet for 4 weeks, then to progress to bariatric regular diet.   2) Consume 60 grams of protein/day.  May restart protein shake.  3) Sip on 48-64 oz of calorie-free/caffeine-free fluids/day- between meals only.  4) Eat slowly (>20 min/meal), chewing foods well (to applesauce-like consistency).  5) Limit portions to 1/2 cup/meal.  6) Take the following supplements:    " Multivitamin/minerals: adult dose 2 times daily    Iron: 45-60 mg elemental daily (18-36 mg daily if low risk) - may partly or fully be covered in multivitamin     Calcium Citrate containing vitamin D: 500 mg 3 times daily or 600 mg 2 times daily    Vitamin B12: sublingual form of at least 500 mcg daily or injection of 1000 mcg monthly     B-50 Complex daily    Follow-Up: 1 month or PRN    Time spent with patient: 30 minutes.  aRdha Orozco, RD, LD

## 2018-03-07 NOTE — LETTER
Date:March 8, 2018      Patient was self referred, no letter generated. Do not send.        [facility] Information

## 2018-03-07 NOTE — PROGRESS NOTES
"Patient is status post laparoscopic reversal of the Aaron-en-Y gastric bypass.  She has done well since surgery.  She did note a recent ED visit for abdominal spasms seen at [facility].  She was placed on medications to alleviate the spasms.  This has worked quite well.  She is tolerating food and is advancing.  No nausea no vomiting or reflux reported.    /62  Pulse 85  Temp 98.6  F (37  C) (Oral)  Ht 5' 4\"  Wt 177 lb 1.6 oz  SpO2 100%  BMI 30.4 kg/m2  On examination her abdomen is soft and nontender her largest port periumbilical has some slight separation but is granulating underneath this.    The patient is doing well and her wound is expected to heal fully she can follow-up with us as needed.  She did ask about potentially seeking neurology consultation here given her seizure disorder.  We will try and make this referral for her.  They did have some opacifications regarding her specific disorder and we will try and make note of this also.  "

## 2018-03-07 NOTE — LETTER
"3/7/2018       RE: Savanah Payton  [address]  [address]   [place] [address]     Dear Colleague,    Thank you for referring your patient, Savanah Payton, to the [facility] SURGICAL WEIGHT MANAGEMENT at [facility]. Please see a copy of my visit note below.    Patient is status post laparoscopic reversal of the Aaron-en-Y gastric bypass.  She has done well since surgery.  She did note a recent ED visit for abdominal spasms seen at [facility].  She was placed on medications to alleviate the spasms.  This has worked quite well.  She is tolerating food and is advancing.  No nausea no vomiting or reflux reported.    /62  Pulse 85  Temp 98.6  F (37  C) (Oral)  Ht 5' 4\"  Wt 177 lb 1.6 oz  SpO2 100%  BMI 30.4 kg/m2  On examination her abdomen is soft and nontender her largest port periumbilical has some slight separation but is granulating underneath this.    The patient is doing well and her wound is expected to heal fully she can follow-up with us as needed.  She did ask about potentially seeking neurology consultation here given her seizure disorder.  We will try and make this referral for her.  They did have some opacifications regarding her specific disorder and we will try and make note of this also.    Again, thank you for allowing me to participate in the care of your patient.      Sincerely,    Mango Gonzalez MD      "

## 2018-03-07 NOTE — NURSING NOTE
"Chief Complaint   Patient presents with     RECHECK     follow up from 2/7/18       Vitals:    03/07/18 1057   BP: 103/62   Pulse: 85   Temp: 98.6  F (37  C)   TempSrc: Oral   SpO2: 100%   Weight: 177 lb 1.6 oz   Height: 5' 4\"       Body mass index is 30.4 kg/(m^2).    Chandrakant Everett CMA                    "

## 2018-03-07 NOTE — MR AVS SNAPSHOT
After Visit Summary   3/7/2018    Savanah Payton    MRN: [id number]           Patient Information     Date Of Birth          1965        Visit Information        Provider Department      3/7/2018 11:45 AM Mango Gonzalez MD [facility] Surgical Weight Management        Today's Diagnoses     Seizure disorder (H)    -  1      Care Instructions    It was a pleasure meeting with you today.     Thank you for allowing us the privilege of caring for you. We hope we provided you with the excellent service you deserve.     Please let us know if there is anything else we can do for you so that we can be sure you are leaving completely satisfied with your care experience.      You saw Dr Gonzalez today.     Instructions per today's visit:     No follow up is needed with Dr. Gonzalez.    Neurology referral is entered.    To schedule appointments with our team, please call [phone] option #1    Please call during clinic hours Monday through Friday 8:00a - 4:00p if you have questions or you can contact us via Yasuu at anytime.      Nurses: [phone] Option # 3 for nurse advice line.  Fax: [phone]  Surgery Scheduler: [phone]    Please call the hospital at [phone] to speak with our on call MDs if you have urgent needs after hours, during weekends, or holidays.              Follow-ups after your visit        Additional Services     NEUROLOGY ADULT REFERRAL       Your provider has referred you for the following:   Consult at [facility]: Neurology Clinic [facility] [phone]   [email or web address]  Seizure disorder    Patient would prefer specialist that has experience in press and botox.    Please be aware that coverage of these services is subject to the terms and limitations of your health insurance plan.  Call member services at your health plan with any benefit or coverage questions.      Please bring the following with you to your appointment:    (1)  "Any X-Rays, CTs or MRIs which have been performed.  Contact the facility where they were done to arrange for  prior to your scheduled appointment.    (2) List of current medications  (3) This referral request   (4) Any documents/labs given to you for this referral                  Who to contact     Please call your clinic at [phone] to:    Ask questions about your health    Make or cancel appointments    Discuss your medicines    Learn about your test results    Speak to your doctor            Additional Information About Your Visit        [email or web address] Information     [employer] gives you secure access to your electronic health record. If you see a primary care provider, you can also send messages to your care team and make appointments. If you have questions, please call your primary care clinic.  If you do not have a primary care provider, please call [phone] and they will assist you.      [employer] is an electronic gateway that provides easy, online access to your medical records. With [employer], you can request a clinic appointment, read your test results, renew a prescription or communicate with your care team.     To access your existing account, please contact your [facility] or call [phone] for assistance.        Care EveryWhere ID     This is your Care EveryWhere ID. This could be used by other organizations to access your [place] medical records  [id number]        Your Vitals Were     Pulse Temperature Height Pulse Oximetry BMI (Body Mass Index)       85 98.6  F (37  C) (Oral) 1.626 m (5' 4\") 100% 30.4 kg/m2        Blood Pressure from Last 3 Encounters:   03/07/18 103/62   02/07/18 114/64   01/31/18 104/63    Weight from Last 3 Encounters:   03/07/18 80.3 kg (177 lb 1.6 oz)   02/07/18 83.6 kg (184 lb 3.2 oz)   01/29/18 85.2 kg (187 lb 13.3 oz)              We Performed the Following     NEUROLOGY ADULT REFERRAL        Primary Care Provider    Provider Not In " System                Equal Access to Services     VARSHA HARKINS : Hadii aad ku haddesmondjs Estelajm, wajulyda luqadaha, qaybta umairminodavid broussard. So [facility] [phone].    ATENCIÓN: Si habla español, tiene a kearns disposición servicios gratuitos de asistencia lingüística. Llame al [phone].    We comply with applicable federal civil rights laws and [place] laws. We do not discriminate on the basis of race, color, national origin, age, disability, sex, sexual orientation, or gender identity.            Thank you!     Thank you for choosing [facility] SURGICAL WEIGHT MANAGEMENT  for your care. Our goal is always to provide you with excellent care. Hearing back from our patients is one way we can continue to improve our services. Please take a few minutes to complete the written survey that you may receive in the mail after your visit with us. Thank you!             Your Updated Medication List - Protect others around you: Learn how to safely use, store and throw away your medicines at www.disposemymeds.org.          This list is accurate as of 3/7/18 12:07 PM.  Always use your most recent med list.                   Brand Name Dispense Instructions for use Diagnosis    BUSPAR PO      Take 30 mg by mouth 2 times daily        Calcium carb-Vitamin D 500 mg Nikolai-200 units 500-200 MG-UNIT per tablet    OSCAL with D;Oyster Shell Calcium     Take 1 tablet by mouth 2 times daily (with meals)        DICYCLOMINE HCL PO      Take 40 mg by mouth 4 times daily        GABAPENTIN PO      Take 800 mg by mouth 4 times daily        LEVOTHYROXINE SODIUM PO      Take 137 mcg by mouth daily        LISINOPRIL PO      Take 10 mg by mouth daily        MAGNESIUM OXIDE PO      Take 500 mg by mouth daily        PROMETHAZINE HCL RE      Place 25 mg rectally daily        RANITIDINE HCL PO      Take 150 mg by mouth 2 times daily        TOPIRAMATE PO      Take 100 mg by mouth 3 times daily        VITAMIN B-12  PO      Take 2,500 mcg by mouth daily

## 2018-05-05 ENCOUNTER — TRANSFERRED RECORDS (OUTPATIENT)
Dept: HEALTH INFORMATION MANAGEMENT | Facility: CLINIC | Age: 53
End: 2018-05-05

## 2018-05-06 ENCOUNTER — TRANSFERRED RECORDS (OUTPATIENT)
Dept: HEALTH INFORMATION MANAGEMENT | Facility: CLINIC | Age: 53
End: 2018-05-06

## 2018-09-05 ENCOUNTER — OFFICE VISIT (OUTPATIENT)
Dept: PLASTIC SURGERY | Facility: CLINIC | Age: 53
End: 2018-09-05
Payer: COMMERCIAL

## 2018-09-05 VITALS
WEIGHT: 180.8 LBS | TEMPERATURE: 98.6 F | OXYGEN SATURATION: 100 % | HEART RATE: 62 BPM | BODY MASS INDEX: 30.87 KG/M2 | SYSTOLIC BLOOD PRESSURE: 117 MMHG | HEIGHT: 64 IN | DIASTOLIC BLOOD PRESSURE: 55 MMHG

## 2018-09-05 DIAGNOSIS — L98.7 EXCESS SKIN OF ABDOMINAL WALL: ICD-10-CM

## 2018-09-05 RX ORDER — ACYCLOVIR 400 MG/1
400 TABLET ORAL
COMMUNITY

## 2018-09-05 RX ORDER — ACETAMINOPHEN 325 MG/1
325-650 TABLET ORAL EVERY 6 HOURS PRN
COMMUNITY

## 2018-09-05 ASSESSMENT — PAIN SCALES - GENERAL: PAINLEVEL: NO PAIN (0)

## 2018-09-05 NOTE — NURSING NOTE
"Chief Complaint   Patient presents with     Abdominal pannus     New patient consultatation for abdominal pannus surgery.        Vitals:    09/05/18 0943   BP: 117/55   BP Location: Left arm   Patient Position: Chair   Cuff Size: Adult Large   Pulse: 62   Temp: 98.6  F (37  C)   TempSrc: Oral   SpO2: 100%   Weight: 180 lb 12.8 oz   Height: 5' 4\"       Body mass index is 31.03 kg/(m^2).  Lulu XCASSIEN                     "

## 2018-09-05 NOTE — PROGRESS NOTES
REFERRING PHYSICIAN:  Mango Gonzalez MD      PRESENTING COMPLAINT:  Consultation for body contouring surgery.      HISTORY OF PRESENT ILLNESS:  Ms. Payton is 53 years old.  She underwent a laparoscopic gastric bypass in 2006 for morbid obesity.  After that, she underwent a revision surgery in 2012 and ultimately a reversal in 01/2018 due to issues related to the bypass.  She was 315 pounds.  She currently weighs 180 pounds.  She has been stable for about 6 months.  Because of her massive weight loss, she has developed a lot of loose skin in her mid and lower abdominal region and she is also unhappy with the droopiness of both breasts.  She is here to discuss options for improving them.  She gets rashes now managed by medications given by her primary care physician.  In the past, she had necrotizing fasciitis of the right groin region that was treated with surgery.      PAST MEDICAL HISTORY:  Hypothyroidism, seizure disorder and hypertension.      PAST SURGICAL HISTORY:  Gastric bypass, tubal ligation, I and D of the right groin.      MEDICATIONS:  Multivitamins, gabapentin, levothyroxine, lisinopril, BuSpar, ranitidine, Topiramate.      ALLERGIES:  None.      SOCIAL HISTORY:  Smokes half pack a day up to a pack a day for last 20 years.  Drinks socially, is a nurse.      REVIEW OF SYSTEMS:  Denies chest pain, shortness of breath, MI.  Has had a CVA in 2012.  No DVT or PE.      PHYSICAL EXAMINATION:  Vital signs stable.  She is afebrile, in no obvious distress.  She is 5 feet 4 inches and 177 pounds, BMI 30.4 kg/m2.  On examination of her abdomen, she has a lot of loose skin in her mid and lower abdominal region.  She has some intertrigo.  No obvious hernias. Grade 2.  She has grade 2-3 ptosis of her breasts, the right breast is significantly larger than her left breast.      LABORATORY DATA:  Latest laboratory values from 08/2018 showed a hemoglobin of 12.8, albumin of 3.8.      ASSESSMENT AND PLAN:  Based on  the above findings, a diagnosis of massive weight loss with excess skin of her abdominal wall along with ptosis of bilateral breasts was made.  I had a long discussion with the patient about body contouring surgery.  I explained to her that at this moment in time, she is not a candidate for any elective surgery until she is off of all nicotine products. I also explained to her the requirements of insurance.  She needs appropriate documentation to see if prior authorization can be obtained to try and get her panniculectomy covered.  I made very clear about what the difference between a panniculectomy and an abdominoplasty was.  I explained to her that her abdominoplasty as well as her breast lifts would be private pay.  We will send her quotes for these procedures.  Once the above steps are taken, we can then decide how best to proceed.  She would ideally need an abdominoplasty and a breast lift.  All questions were answered.  She was happy with the visit.  All exam and discussion done in the presence of my nurse.      Total time spent with patient 30 minutes, more than half was counseling.      cc:    Mango Gonzalez MD    [address] [facility], [address] [address]

## 2018-09-05 NOTE — LETTER
9/5/2018       RE: Savanah Payotn  [address] [facility] [address]  [place] [address]     Dear Colleague,    Thank you for referring your patient, Savanah Payton, to the [facility] PLASTIC AND RECONSTRUCTIVE SURGERY at [facility]. Please see a copy of my visit note below.    REFERRING PHYSICIAN:  Mango Gonzalez MD      PRESENTING COMPLAINT:  Consultation for body contouring surgery.      HISTORY OF PRESENT ILLNESS:  Ms. Payton is 53 years old.  She underwent a laparoscopic gastric bypass in 2006 for morbid obesity.  After that, she underwent a revision surgery in 2012 and ultimately a reversal in 01/2018 due to issues related to the bypass.  She was 315 pounds.  She currently weighs 180 pounds.  She has been stable for about 6 months.  Because of her massive weight loss, she has developed a lot of loose skin in her mid and lower abdominal region and she is also unhappy with the droopiness of both breasts.  She is here to discuss options for improving them.  She gets rashes now managed by medications given by her primary care physician.  In the past, she had necrotizing fasciitis of the right groin region that was treated with surgery.      PAST MEDICAL HISTORY:  Hypothyroidism, seizure disorder and hypertension.      PAST SURGICAL HISTORY:  Gastric bypass, tubal ligation, I and D of the right groin.      MEDICATIONS:  Multivitamins, gabapentin, levothyroxine, lisinopril, BuSpar, ranitidine, Topiramate.      ALLERGIES:  None.      SOCIAL HISTORY:  Smokes half pack a day up to a pack a day for last 20 years.  Drinks socially, is a nurse.      REVIEW OF SYSTEMS:  Denies chest pain, shortness of breath, MI.  Has had a CVA in 2012.  No DVT or PE.      PHYSICAL EXAMINATION:  Vital signs stable.  She is afebrile, in no obvious distress.  She is 5 feet 4 inches and 177 pounds, BMI 30.4 kg/m2.  On examination of her abdomen, she has a lot of loose skin in her mid and lower abdominal region.   She has some intertrigo.  No obvious hernias.  She has grade 2-3 ptosis of her breasts, the right breast is significantly larger than her left breast.      LABORATORY DATA:  Latest laboratory values from 08/2018 showed a hemoglobin of 12.8, albumin of 3.8.      ASSESSMENT AND PLAN:  Based on the above findings, a diagnosis of massive weight loss with excess skin of her abdominal wall along with ptosis of bilateral breasts was made.  I had a long discussion with the patient about body contouring surgery.  I explained to her that at this moment in time, she is not a candidate for any elective surgery until she is off of all nicotine products. I also explained to her the requirements of insurance.  She needs appropriate documentation to see if prior authorization can be obtained to try and get her panniculectomy covered.  I made very clear about what the difference between a panniculectomy and an abdominoplasty was.  I explained to her that her abdominoplasty as well as her breast lifts would be private pay.  We will send her quotes for these procedures.  Once the above steps are taken, we can then decide how best to proceed.  She would ideally need an abdominoplasty and a breast lift.  All questions were answered.  She was happy with the visit.  All exam and discussion done in the presence of my nurse.      Total time spent with patient 30 minutes, more than half was counseling.          Again, thank you for allowing me to participate in the care of your patient.      Sincerely,    JERRY Hernandez MD    cc:    Mango Gonzalez MD    [address] [facility]   [place] [address]

## 2018-09-06 ENCOUNTER — TELEPHONE (OUTPATIENT)
Dept: REGISTRATION | Facility: CLINIC | Age: 53
End: 2018-09-06

## 2018-09-06 NOTE — TELEPHONE ENCOUNTER
Pt was contacted by phone and message left to call back for estimate.  Pt informed that estimate letter will be mailed. Estimate letter mailed to pt.

## 2018-09-12 ENCOUNTER — TELEPHONE (OUTPATIENT)
Dept: SURGERY | Facility: CLINIC | Age: 53
End: 2018-09-12

## 2018-09-12 ENCOUNTER — TELEPHONE (OUTPATIENT)
Dept: NEUROLOGY | Facility: CLINIC | Age: 53
End: 2018-09-12

## 2018-09-12 NOTE — TELEPHONE ENCOUNTER
Patient returned my phone call, she does not have a PCP treating her.  She was seen at [facility] for infection for flesh eating disease.  I asked her to obtain that information for me, then I will follow up with Dr Hernandez

## 2018-09-12 NOTE — TELEPHONE ENCOUNTER
Received staff message to review for a prior authorization, pt has [employer] platnum which will follow medicare guidelines.  In order to make sure that medical necessity is met, will call patient and ask for documentation from PCP

## 2018-09-12 NOTE — TELEPHONE ENCOUNTER
[facility] Call Center    Phone Message    May a detailed message be left on voicemail: yes    Reason for Call: Other: Pt needs a PA for her botox sent to [employer].      Action Taken: Message routed to:  [facility] ([facility] Neurology

## 2018-09-13 ENCOUNTER — TELEPHONE (OUTPATIENT)
Dept: SURGERY | Facility: CLINIC | Age: 53
End: 2018-09-13

## 2018-09-13 NOTE — TELEPHONE ENCOUNTER
We do not start the prior auth process prior to pt evaluation as a rule. The only exception is I have provided Botox for them at my old position and considering it has now been 9 months in new position I would not even make that an exception. Please reassure the patient that once she is seen the authorization and injection can go pretty quickly and I work very hard to get people appt at the earliest available time.     Ana (Routing comment)     =========================================================================================================    9/13/18: Called and left voicemail message for patient explaining that she will need an evaluation by Dr Huerta before the authorization process can begin. Left our contact info incase she has any questions/concerns.

## 2018-09-13 NOTE — TELEPHONE ENCOUNTER
Received fax from [facility] with notes from inpt stay.  Sent staff message to care team, not the documentation needed to prove medical necessity

## 2018-09-14 ENCOUNTER — TELEPHONE (OUTPATIENT)
Dept: SURGERY | Facility: CLINIC | Age: 53
End: 2018-09-14

## 2018-09-14 NOTE — TELEPHONE ENCOUNTER
called the patient, she would like a quote for the panni as well, she did state that she was told that the breast lift and abdominoplasty could not be done at same time.  She would like the break down on these surgeries and if the panni can be done with the breast lift or with the abdominoplasty?  She states she will probably be waiting a little longer on the breast lift.  (she did receive the quote from [facility] earlier)

## 2018-09-25 ASSESSMENT — ENCOUNTER SYMPTOMS
CONSTIPATION: 0
DOUBLE VISION: 0
TASTE DISTURBANCE: 0
NERVOUS/ANXIOUS: 1
PARALYSIS: 0
TROUBLE SWALLOWING: 0
DECREASED CONCENTRATION: 1
RECTAL PAIN: 0
MUSCLE WEAKNESS: 1
VOMITING: 0
SINUS CONGESTION: 1
MYALGIAS: 1
ABDOMINAL PAIN: 0
HOARSE VOICE: 0
JAUNDICE: 0
STIFFNESS: 1
HEARTBURN: 1
SINUS PAIN: 1
NECK MASS: 0
DEPRESSION: 1
NAUSEA: 1
EYE PAIN: 0
SEIZURES: 0
TINGLING: 0
NUMBNESS: 0
EYE REDNESS: 0
DISTURBANCES IN COORDINATION: 0
EYE IRRITATION: 0
SMELL DISTURBANCE: 0
HEADACHES: 1
INSOMNIA: 1
MUSCLE CRAMPS: 0
MEMORY LOSS: 1
NECK PAIN: 1
DIARRHEA: 0
JOINT SWELLING: 0
BACK PAIN: 1
BOWEL INCONTINENCE: 0
DIZZINESS: 1
BLOATING: 0
TREMORS: 1
WEAKNESS: 1
SPEECH CHANGE: 0
SORE THROAT: 0
BLOOD IN STOOL: 0
LOSS OF CONSCIOUSNESS: 0
PANIC: 0
EYE WATERING: 0
ARTHRALGIAS: 1

## 2018-09-25 NOTE — OR NURSING
EGD under conscious sedation wearing 2lpm 02 via NC.  Pt on left side for exam, tolerated procedure.  No interventions.  Post exam pt deneis SOB/CP.  Pt reports abd pain no different from preprocedure.   hx of right thigh mass x 6 months.  Scheduled for resection right thigh mass 10/3/18 hx of right thigh pain x 6 months.  Soft tissue mass right thigh discovered on imaging.  Scheduled for resection right thigh mass 10/3/18. soft tissue mass right thigh per dx

## 2018-10-01 ENCOUNTER — PATIENT OUTREACH (OUTPATIENT)
Dept: CARE COORDINATION | Facility: CLINIC | Age: 53
End: 2018-10-01

## 2018-10-02 ENCOUNTER — OFFICE VISIT (OUTPATIENT)
Dept: NEUROLOGY | Facility: CLINIC | Age: 53
End: 2018-10-02
Payer: COMMERCIAL

## 2018-10-02 VITALS
WEIGHT: 189 LBS | SYSTOLIC BLOOD PRESSURE: 99 MMHG | HEIGHT: 64 IN | TEMPERATURE: 98.6 F | HEART RATE: 79 BPM | DIASTOLIC BLOOD PRESSURE: 66 MMHG | BODY MASS INDEX: 32.27 KG/M2

## 2018-10-02 DIAGNOSIS — G43.719 INTRACTABLE CHRONIC MIGRAINE WITHOUT AURA AND WITHOUT STATUS MIGRAINOSUS: Primary | ICD-10-CM

## 2018-10-02 ASSESSMENT — PAIN SCALES - GENERAL: PAINLEVEL: MODERATE PAIN (5)

## 2018-10-02 NOTE — LETTER
10/2/2018       RE: Savanah Payton  [address]   [address]  [facility] [address]     Dear Colleague,    Thank you for referring your patient, Savanah Payton, to the [facility] NEUROLOGY at [facility]. Please see a copy of my visit note below.        [facility]    Savanah Payton MRN# [id number]   Age: 53 year old YOB: 1965     Requesting physician: Miranda Loaiza     Chief Complaint:  To establish care for chronic migraines    History of Present Illness:  Ms. Payton is a 53-year-old female with past medical history of chronic migraines since age 7, posterior reversible encephalopathy syndrome and stroke in 2012, and generalized, myoclonic seizures.    Reports receiving Botox injections since 2014, with the only complication of neck weakness with the first 2 injections, no bruises or excessive bleeding. Prior to starting Botox injections her baseline migraine rate was 20-25 days/month. Her currently with Botox is 12 days/month with severity of migraines increasing the 2-3 weeks prior to next scheduled injection. Reports distrubution of migraine is from middle of forehead to top of scalp, throbbing and constricting in nature, with associated photophobia, no nausea or vomiting, lasting 4-5 hours with severity 4-5/10. Tylenol 325mg and rest completely aborts migraine. Reports no identifiable triggers. Prior to Botox injections she tried propranolol, amitriptyline, nortriptyline, Depakote, chiropractor, and Mg supplements without relief of migraines. She is postmenopausal, currently smoke 1/2 ppd since age 18, 2 beers/week, denies other drug use.     HPI scribed by Medical student Michael Albert MS 4.    I have reviewed the documentation above and agree with information.   In summary:  Baseline headache frequency: 20-25 days a month  Headache frequency after Botox injections: 12 days a month  Headache duration of migraines 4-5 hours  Associated migraine  features: Nausea, photophobia and phonophobia    She has demonstrated an excellent response to treatments with Botox.        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     Kidney stone Savanah    Many but no repeats in over 10 years     Migraines Savanah    Since age [age]     Other nervous system complications 10/12    PRES     Seizure disorder (H)      Seizures (H) 10/12    PRES     STD (sexually transmitted disease) 15 years    Herpes     Thyroid disease Savanah    About 20 years     Ulcer, gastric, acute 10/12 12/16     Wounds and injuries 5/10    Groin       Patient Active Problem List   Diagnosis     Complications of gastric bypass surgery     Excess skin of abdominal wall       Past Surgical History:   Procedure Laterality Date     COLONOSCOPY  10/16     ESOPHAGOSCOPY, GASTROSCOPY, DUODENOSCOPY (EGD), COMBINED N/A 9/26/2017    Procedure: COMBINED ESOPHAGOSCOPY, GASTROSCOPY, DUODENOSCOPY (EGD), BIOPSY SINGLE OR MULTIPLE;  EGD;  Surgeon: Mango Gonzalez MD;  Location:  GI     ESOPHAGOSCOPY, GASTROSCOPY, DUODENOSCOPY (EGD), COMBINED N/A 12/12/2017    Procedure: COMBINED ESOPHAGOSCOPY, GASTROSCOPY, DUODENOSCOPY (EGD);  Upper Endo;  Surgeon: Mango Gonzalez MD;  Location:  GI     GENITOURINARY SURGERY  ,6/04. 8/10    Tubal. Ablation     LAPAROSCOPIC REVISION TYLER-EN-Y N/A 1/29/2018    Procedure: LAPAROSCOPIC REVISION TYLER-EN-Y;  Laparoscopic Reversal Of Tyler En Y Gastric Bypass, Hiatal Hernia Repair;  Surgeon: Mango Gonzalez MD;  Location:  OR       Social  History     Social History     Marital status:      Spouse name: N/A     Number of children: N/A     Years of education: N/A     Occupational History     Not on file.     Social History Main Topics     Smoking status: Light Tobacco Smoker     Packs/day: 0.50     Years: 1.00     Types: Cigarettes     Start date: 7/1/2017     Smokeless tobacco: Never Used     Alcohol use Yes      Comment: Light     Drug use: No     Sexual activity: Yes     Partners: Male     Birth control/ protection: Female Surgical     Other Topics Concern     Not on file     Social History Narrative       Family History   Problem Relation Age of Onset     Diabetes Father      Coronary Artery Disease Father      Hypertension Father      Hyperlipidemia Father      Colon Polyps Father      Obesity Father      Hypertension Mother      Hyperlipidemia Mother      Hypertension Maternal Grandmother      Cerebrovascular Disease Maternal Grandmother      Hypertension Paternal Grandmother      Cerebrovascular Disease Paternal Grandmother      Hypertension Paternal Grandfather      Aneurysm Paternal Grandfather      Colon Polyps Paternal Grandfather      Substance Abuse Paternal Grandfather      Breast Cancer Sister      Substance Abuse Maternal Grandfather        Current Outpatient Prescriptions   Medication Sig     acetaminophen (TYLENOL) 325 MG tablet Take 325-650 mg by mouth every 6 hours as needed for mild pain     acyclovir (ZOVIRAX) 400 MG tablet Take 400 mg by mouth 5 times daily     BusPIRone HCl (BUSPAR PO) Take 30 mg by mouth 2 times daily      Cyanocobalamin (VITAMIN B-12 PO) Take 2,500 mcg by mouth daily     DICYCLOMINE HCL PO Take 40 mg by mouth 4 times daily     GABAPENTIN PO Take 800 mg by mouth 4 times daily     LEVOTHYROXINE SODIUM PO Take 137 mcg by mouth daily      LISINOPRIL PO Take 10 mg by mouth daily     PROMETHAZINE HCL RE Place 25 mg rectally daily     RANITIDINE HCL PO Take 150 mg by mouth 2 times daily     TOPIRAMATE PO Take  "100 mg by mouth 3 times daily     calcium carb 1250 mg, 500 mg [place],/vitamin D 200 units (OSCAL WITH D) 500-200 MG-UNIT per tablet Take 1 tablet by mouth 2 times daily (with meals)     MAGNESIUM OXIDE PO Take 500 mg by mouth daily     No current facility-administered medications for this visit.           Allergies   Allergen Reactions     Bactrim [Sulfamethoxazole W/Trimethoprim] GI Disturbance       ROS: Please see HPI all other systems review and negative.    Physical Examination:  BP 99/66 (BP Location: Left arm)  Pulse 79  Temp 98.6  F (37  C) (Oral)  Ht 1.626 m (5' 4\")  Wt 85.7 kg (189 lb)  BMI 32.44 kg/m2  General Appearance:  The patient is well groomed and cooperative with exam. She is in no acute distress  Neurological Examination  Cognition: oriented x3, attention and recall intact. No aphasia or dysarthria  Cranial Nerves: 2-12 intact. Funduscopic examination is normal with sharp disc margins bilaterally.     Gait: Normal gait which is stable on turns. Normal arm swing. Romberg negative.    Cardiovascular Examination:  Heart is regular in rate and rhythm to auscultation. No significant murmurs. No carotid bruits. No significant peripheral edema. Pedal pulses are palpable bilaterally.     Musculoskeletal Examination:  Neck is supple with full range of motion. No tenderness to palpation.        Impression/Recommendations:  1. Chronic Migraine:  The patient has chronic migraine that has responded well to Botox injections. I would recommend continued treatment with Botox once injections are approved.     Info for approval listed above in HPI.    Discussed Cefaly for acute treatment and Aimovig for possible future use.     45 minutes with the patient over 50% counseling.    Again, thank you for allowing me to participate in the care of your patient.      Sincerely,    Ana Huerta MD      "

## 2018-10-02 NOTE — NURSING NOTE
Chief Complaint   Patient presents with     New Patient     BOTOX TRANSFER OF CARE       Preventive Care:    Breast Cancer Screening: During our visit today, we discussed that it is recommended you receive breast cancer screening. Please call or make an appointment with your primary care provider to discuss this with them. You may also call the [facility] scheduling line ([phone]) to set up a mammography appointment at the Breast Center within the [facility].      Ynes Bond MA

## 2018-10-02 NOTE — PROGRESS NOTES
[facility]    Savanah Payton MRN# [id number]   Age: 53 year old YOB: 1965     Requesting physician: Miranda Loaiza     Chief Complaint:  To establish care for chronic migraines    History of Present Illness:  Ms. Payton is a 53-year-old female with past medical history of chronic migraines since age 7, posterior reversible encephalopathy syndrome and stroke in 2012, and generalized, myoclonic seizures.    Reports receiving Botox injections since 2014, with the only complication of neck weakness with the first 2 injections, no bruises or excessive bleeding. Prior to starting Botox injections her baseline migraine rate was 20-25 days/month. Her currently with Botox is 12 days/month with severity of migraines increasing the 2-3 weeks prior to next scheduled injection. Reports distrubution of migraine is from middle of forehead to top of scalp, throbbing and constricting in nature, with associated photophobia, no nausea or vomiting, lasting 4-5 hours with severity 4-5/10. Tylenol 325mg and rest completely aborts migraine. Reports no identifiable triggers. Prior to Botox injections she tried propranolol, amitriptyline, nortriptyline, Depakote, chiropractor, and Mg supplements without relief of migraines. She is postmenopausal, currently smoke 1/2 ppd since age 18, 2 beers/week, denies other drug use.     HPI scribed by Medical student Michael Albert MS 4.    I have reviewed the documentation above and agree with information.   In summary:  Baseline headache frequency: 20-25 days a month  Headache frequency after Botox injections: 12 days a month  Headache duration of migraines 4-5 hours  Associated migraine features: Nausea, photophobia and phonophobia    She has demonstrated an excellent response to treatments with Botox.        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     Kidney stone Savanah    Many but no repeats in over 10 years     Migraines Savanah    Since age [age]     Other nervous system complications 10/12    PRES     Seizure disorder (H)      Seizures (H) 10/12    PRES     STD (sexually transmitted disease) 15 years    Herpes     Thyroid disease Savanah    About 20 years     Ulcer, gastric, acute 10/12 12/16     Wounds and injuries 5/10    Groin       Patient Active Problem List   Diagnosis     Complications of gastric bypass surgery     Excess skin of abdominal wall       Past Surgical History:   Procedure Laterality Date     COLONOSCOPY  10/16     ESOPHAGOSCOPY, GASTROSCOPY, DUODENOSCOPY (EGD), COMBINED N/A 9/26/2017    Procedure: COMBINED ESOPHAGOSCOPY, GASTROSCOPY, DUODENOSCOPY (EGD), BIOPSY SINGLE OR MULTIPLE;  EGD;  Surgeon: Mango Gonzalez MD;  Location:  GI     ESOPHAGOSCOPY, GASTROSCOPY, DUODENOSCOPY (EGD), COMBINED N/A 12/12/2017    Procedure: COMBINED ESOPHAGOSCOPY, GASTROSCOPY, DUODENOSCOPY (EGD);  Upper Endo;  Surgeon: Mango Gonzalez MD;  Location:  GI     GENITOURINARY SURGERY  ,6/04. 8/10    Tubal. Ablation     LAPAROSCOPIC REVISION TYLER-EN-Y N/A 1/29/2018    Procedure: LAPAROSCOPIC REVISION TYLER-EN-Y;  Laparoscopic Reversal Of Tyler En Y Gastric Bypass, Hiatal Hernia Repair;  Surgeon: Mango Gonzalez MD;  Location:  OR       Social History     Social History     Marital status:      Spouse name: N/A     Number of children: N/A     Years of education: N/A     Occupational History     Not on file.     Social History Main Topics     Smoking status: Light Tobacco Smoker     Packs/day: 0.50     Years:  1.00     Types: Cigarettes     Start date: 7/1/2017     Smokeless tobacco: Never Used     Alcohol use Yes      Comment: Light     Drug use: No     Sexual activity: Yes     Partners: Male     Birth control/ protection: Female Surgical     Other Topics Concern     Not on file     Social History Narrative       Family History   Problem Relation Age of Onset     Diabetes Father      Coronary Artery Disease Father      Hypertension Father      Hyperlipidemia Father      Colon Polyps Father      Obesity Father      Hypertension Mother      Hyperlipidemia Mother      Hypertension Maternal Grandmother      Cerebrovascular Disease Maternal Grandmother      Hypertension Paternal Grandmother      Cerebrovascular Disease Paternal Grandmother      Hypertension Paternal Grandfather      Aneurysm Paternal Grandfather      Colon Polyps Paternal Grandfather      Substance Abuse Paternal Grandfather      Breast Cancer Sister      Substance Abuse Maternal Grandfather        Current Outpatient Prescriptions   Medication Sig     acetaminophen (TYLENOL) 325 MG tablet Take 325-650 mg by mouth every 6 hours as needed for mild pain     acyclovir (ZOVIRAX) 400 MG tablet Take 400 mg by mouth 5 times daily     BusPIRone HCl (BUSPAR PO) Take 30 mg by mouth 2 times daily      Cyanocobalamin (VITAMIN B-12 PO) Take 2,500 mcg by mouth daily     DICYCLOMINE HCL PO Take 40 mg by mouth 4 times daily     GABAPENTIN PO Take 800 mg by mouth 4 times daily     LEVOTHYROXINE SODIUM PO Take 137 mcg by mouth daily      LISINOPRIL PO Take 10 mg by mouth daily     PROMETHAZINE HCL RE Place 25 mg rectally daily     RANITIDINE HCL PO Take 150 mg by mouth 2 times daily     TOPIRAMATE PO Take 100 mg by mouth 3 times daily     calcium carb 1250 mg, 500 mg [place],/vitamin D 200 units (OSCAL WITH D) 500-200 MG-UNIT per tablet Take 1 tablet by mouth 2 times daily (with meals)     MAGNESIUM OXIDE PO Take 500 mg by mouth daily     No current facility-administered  "medications for this visit.           Allergies   Allergen Reactions     Bactrim [Sulfamethoxazole W/Trimethoprim] GI Disturbance       ROS: Please see HPI all other systems review and negative.    Physical Examination:  BP 99/66 (BP Location: Left arm)  Pulse 79  Temp 98.6  F (37  C) (Oral)  Ht 1.626 m (5' 4\")  Wt 85.7 kg (189 lb)  BMI 32.44 kg/m2  General Appearance:  The patient is well groomed and cooperative with exam. She is in no acute distress  Neurological Examination  Cognition: oriented x3, attention and recall intact. No aphasia or dysarthria  Cranial Nerves: 2-12 intact. Funduscopic examination is normal with sharp disc margins bilaterally.     Gait: Normal gait which is stable on turns. Normal arm swing. Romberg negative.    Cardiovascular Examination:  Heart is regular in rate and rhythm to auscultation. No significant murmurs. No carotid bruits. No significant peripheral edema. Pedal pulses are palpable bilaterally.     Musculoskeletal Examination:  Neck is supple with full range of motion. No tenderness to palpation.        Impression/Recommendations:  1. Chronic Migraine:  The patient has chronic migraine that has responded well to Botox injections. I would recommend continued treatment with Botox once injections are approved.     Info for approval listed above in HPI.    Discussed Cefaly for acute treatment and Aimovig for possible future use.     45 minutes with the patient over 50% counseling.    Ana Huerta MD [facility]  Department of Neurology  Pager [phone]          Answers for HPI/ROS submitted by the patient on 9/25/2018   General Symptoms: No  Skin Symptoms: No  HENT Symptoms: Yes  EYE SYMPTOMS: Yes  HEART SYMPTOMS: No  LUNG SYMPTOMS: No  INTESTINAL SYMPTOMS: Yes  URINARY SYMPTOMS: No  GYNECOLOGIC SYMPTOMS: No  BREAST SYMPTOMS: No  SKELETAL SYMPTOMS: Yes  BLOOD SYMPTOMS: No  NERVOUS SYSTEM SYMPTOMS: Yes  MENTAL HEALTH SYMPTOMS: Yes  Ear pain: No  Ear discharge: No  Hearing loss: " No  Tinnitus: No  Nosebleeds: No  Congestion: Yes  Sinus pain: Yes  Trouble swallowing: No   Voice hoarseness: No  Mouth sores: No  Sore throat: No  Tooth pain: Yes  Gum tenderness: No  Bleeding gums: No  Change in taste: No  Change in sense of smell: No  Dry mouth: No  Hearing aid used: No  Neck lump: No  Eye pain: No  Vision loss: Yes  Dry eyes: No  Watery eyes: No  Eye bulging: No  Double vision: No  Flashing of lights: No  Spots: Yes  Floaters: No  Redness: No  Crossed eyes: No  Tunnel Vision: No  Yellowing of eyes: No  Eye irritation: No  Heart burn or indigestion: Yes  Nausea: Yes  Vomiting: No  Abdominal pain: No  Bloating: No  Constipation: No  Diarrhea: No  Blood in stool: No  Black stools: No  Rectal or Anal pain: No  Fecal incontinence: No  Yellowing of skin or eyes: No  Vomit with blood: No  Change in stools: No  Back pain: Yes  Muscle aches: Yes  Neck pain: Yes  Swollen joints: No  Joint pain: Yes  Bone pain: No  Muscle cramps: No  Muscle weakness: Yes  Joint stiffness: Yes  Bone fracture: No  Trouble with coordination: No  Dizziness or trouble with balance: Yes  Fainting or black-out spells: No  Memory loss: Yes  Headache: Yes  Seizures: No  Speech problems: No  Tingling: No  Tremor: Yes  Weakness: Yes  Difficulty walking: No  Paralysis: No  Numbness: No  Nervous or Anxious: Yes  Depression: Yes  Trouble sleeping: Yes  Trouble thinking or concentrating: Yes  Mood changes: No  Panic attacks: No

## 2018-10-02 NOTE — MR AVS SNAPSHOT
After Visit Summary   10/2/2018    Savanah Payton    MRN: [id number]           Patient Information     Date Of Birth          1965        Visit Information        Provider Department      10/2/2018 11:30 AM Ana Huerta MD [facility] Neurology        Today's Diagnoses     Intractable chronic migraine without aura and without status migrainosus    -  1      Care Instructions    Consider trying Cefaly device. [email or web address]    Also consider Aimovig injections once a month          Follow-ups after your visit        Follow-up notes from your care team     Return if symptoms worsen or fail to improve.      Your next 10 appointments already scheduled     Oct 19, 2018 10:00 AM CDT   New Visit with Whit Beltre MD   [facility]    [address] [place] [id number] [phone]              Who to contact     Please call your clinic at [phone] to:    Ask questions about your health    Make or cancel appointments    Discuss your medicines    Learn about your test results    Speak to your doctor            Additional Information About Your Visit        MyChart Information     [employer] gives you secure access to your electronic health record. If you see a primary care provider, you can also send messages to your care team and make appointments. If you have questions, please call your primary care clinic.  If you do not have a primary care provider, please call [phone] and they will assist you.      [employer] is an electronic gateway that provides easy, online access to your medical records. With [employer], you can request a clinic appointment, read your test results, renew a prescription or communicate with your care team.     To access your existing account, please contact your [facility] or call [phone] for assistance.        Care EveryWhere ID     This is your Care EveryWhere ID.  "This could be used by other organizations to access your [place] medical records  [id number]        Your Vitals Were     Pulse Temperature Height BMI (Body Mass Index)          79 98.6  F (37  C) (Oral) 1.626 m (5' 4\") 32.44 kg/m2         Blood Pressure from Last 3 Encounters:   10/02/18 99/66   09/05/18 117/55   03/07/18 103/62    Weight from Last 3 Encounters:   10/02/18 85.7 kg (189 lb)   09/05/18 82 kg (180 lb 12.8 oz)   03/07/18 80.3 kg (177 lb 1.6 oz)              Today, you had the following     No orders found for display       Primary Care Provider Office Phone # Fax #    Miranda LONDON Zacarias [phone]       [place] [address] [facility] [address]        Equal Access to Services     SURYA [facility] Hospital[facility]ACQUI : Hadii france fenton hadasho Soomaali, waaxda luqadaha, qaybta kaalmada adeegyada, david beavers . So [facility] [phone].    ATENCIÓN: Si habla español, tiene a kearns disposición servicios gratuitos de asistencia lingüística. Paramjit al [phone].    We comply with applicable federal civil rights laws and [place] laws. We do not discriminate on the basis of race, color, national origin, age, disability, sex, sexual orientation, or gender identity.            Thank you!     Thank you for choosing [facility] NEUROLOG[facility]  for your care. Our goal is always to provide you with excellent care. Hearing back from our patients is one way we can continue to improve our services. Please take a few minutes to complete the written survey that you may receive in the mail after your visit with us. Thank you!             Your Updated Medication List - Protect others around you: Learn how to safely use, store and throw away your medicines at www.disposemymeds.org.          This list is accurate as of 10/2/18 12:27 PM.  Always use your most recent med list.                   Brand Name Dispense Instructions for use Diagnosis    acyclovir 400 MG tablet    ZOVIRAX     Take 400 mg by mouth " 5 times daily        BUSPAR PO      Take 30 mg by mouth 2 times daily        calcium carbonate 500 mg-vitamin D 200 units 500-200 MG-UNIT per tablet    OSCAL with D;OYSTER SHELL CALCIUM     Take 1 tablet by mouth 2 times daily (with meals)        DICYCLOMINE HCL PO      Take 40 mg by mouth 4 times daily        GABAPENTIN PO      Take 800 mg by mouth 4 times daily        LEVOTHYROXINE SODIUM PO      Take 137 mcg by mouth daily        LISINOPRIL PO      Take 10 mg by mouth daily        MAGNESIUM OXIDE PO      Take 500 mg by mouth daily        PROMETHAZINE HCL RE      Place 25 mg rectally daily        RANITIDINE HCL PO      Take 150 mg by mouth 2 times daily        TOPIRAMATE PO      Take 100 mg by mouth 3 times daily        TYLENOL 325 MG tablet   Generic drug:  acetaminophen      Take 325-650 mg by mouth every 6 hours as needed for mild pain        VITAMIN B-12 PO      Take 2,500 mcg by mouth daily

## 2018-10-09 ASSESSMENT — ENCOUNTER SYMPTOMS
INSOMNIA: 1
CONSTIPATION: 0
STIFFNESS: 1
PARALYSIS: 0
NERVOUS/ANXIOUS: 1
HEARTBURN: 1
SEIZURES: 0
WEAKNESS: 1
HEADACHES: 1
MEMORY LOSS: 1
SINUS PAIN: 1
RECTAL PAIN: 0
DEPRESSION: 1
TINGLING: 0
ARTHRALGIAS: 1
DIZZINESS: 1
BOWEL INCONTINENCE: 0
TREMORS: 1
EYE IRRITATION: 0
DOUBLE VISION: 0
BACK PAIN: 1
MUSCLE WEAKNESS: 1
HOARSE VOICE: 0
SINUS CONGESTION: 1
SPEECH CHANGE: 0
VOMITING: 0
DECREASED CONCENTRATION: 1
BLOATING: 0
NUMBNESS: 0
ABDOMINAL PAIN: 0
LOSS OF CONSCIOUSNESS: 0
TASTE DISTURBANCE: 0
SORE THROAT: 0
TROUBLE SWALLOWING: 0
SMELL DISTURBANCE: 0
EYE WATERING: 0
DISTURBANCES IN COORDINATION: 0
JAUNDICE: 0
MUSCLE CRAMPS: 0
BLOOD IN STOOL: 0
NECK PAIN: 1
NAUSEA: 1
EYE REDNESS: 0
JOINT SWELLING: 0
MYALGIAS: 1
NECK MASS: 0
PANIC: 0
EYE PAIN: 0
DIARRHEA: 0

## 2018-10-19 ENCOUNTER — OFFICE VISIT (OUTPATIENT)
Dept: NEUROLOGY | Facility: CLINIC | Age: 53
End: 2018-10-19
Payer: COMMERCIAL

## 2018-10-19 VITALS
WEIGHT: 190 LBS | HEART RATE: 64 BPM | OXYGEN SATURATION: 97 % | SYSTOLIC BLOOD PRESSURE: 92 MMHG | DIASTOLIC BLOOD PRESSURE: 57 MMHG | HEIGHT: 64 IN | BODY MASS INDEX: 32.44 KG/M2

## 2018-10-19 DIAGNOSIS — G40.109 FOCAL EPILEPSY (H): Primary | ICD-10-CM

## 2018-10-19 PROCEDURE — 99215 OFFICE O/P EST HI 40 MIN: CPT | Performed by: PSYCHIATRY & NEUROLOGY

## 2018-10-19 RX ORDER — KETOROLAC TROMETHAMINE 5 MG/ML
SOLUTION OPHTHALMIC
COMMUNITY
Start: 2018-10-08 | End: 2020-10-09

## 2018-10-19 RX ORDER — TRAZODONE HYDROCHLORIDE 150 MG/1
150 TABLET ORAL AT BEDTIME
COMMUNITY
Start: 2018-10-17

## 2018-10-19 RX ORDER — OFLOXACIN 3 MG/ML
SOLUTION/ DROPS OPHTHALMIC
COMMUNITY
Start: 2018-10-08 | End: 2020-10-09

## 2018-10-19 RX ORDER — PREDNISOLONE ACETATE 10 MG/ML
SUSPENSION/ DROPS OPHTHALMIC
COMMUNITY
Start: 2018-10-08 | End: 2020-10-09

## 2018-10-19 RX ORDER — PROMETHAZINE HYDROCHLORIDE 25 MG/1
TABLET ORAL PRN
COMMUNITY
Start: 2018-10-02

## 2018-10-19 NOTE — LETTER
"    10/19/2018         RE: Savanah Payton  [address]  [address]  [facility] [address]        Dear Colleague,    Thank you for referring your patient, Savanah Payton, to the [facility]. Please see a copy of my visit note below.    [facility] History and Physical       Patient:  Savanah Payton  :  1965   Age:  53 year old   Today's Office Visit:  10/19/2018    Referring Provider:    Jose Elias Payton DO  [facility]  [address]  [place] [address]    Introduction: the patient is a 53 year old right handed woman who presented for evaluation of seizures.  She is accompanied by her .      History of Present Illness:      The patient was diagnosed with PRES in .  She had seizures for 3 hours and got intubated. This happened 3 days after she had gastric bypass surgery. She was in [place] at that time and was treated at the [facility]. Since then she had recurrent PRES, including in .    She describes seizures that happened at that time.  She says she feels them coming on, she feels tight in her throat. She feels the twitching in the face mostly the right. Then she loses consciousness, but usually have enough time to tell she is going to have one, She says \"oh, Sh..\"occording to her  and then she flops around. It continues for 10 minutes. She was somewhat back to baseline in 10 minutes, but she had another one on the way to the hospital. She bit her tongue, no urinary incontinence. She was started on Dilantin in that hospitalization in , then was switched to topiramate.   She says when she was discharged from the hospital, she had weakness of her right side, weakness in her right arm resolved quickly, but right leg weakness continued for about a month.  She says still she doesn't feel her leg below the knee well.   She also had convulsions in  when she was hospitalized again for PRES. She also had low blood sugar at that " time.  She hasn't had any convulsions for almost 2 years.     She also has spells with sudden body jerk, which are very brief. She doesn't lose awareness. She had them frequently approximately 3-4 times a day before coming to [place] in 2016. Currently, she only has them occasionally usually happen when she lays down.     Denies other types of seizures or seizure-like activities.    PMH: Fibromyalgia, gastric bypass surgery, depression, anxiety (treated by PCP, used to see a counselor in the past), hypothyroidism, back pain, herniated disc, ?stroke, migraines.    Epilepsy Risk Factors:  The patient denies history of meningitis, encephalitis, febrile seizures, family history of epilepsy or significant head injuries.  She had PRES in 2012 and 2013 when she had convulsive seizures.  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     Kidney stone Savanah    Many but no repeats in over 10 years     Migraines Savanah    Since age [age]     Other nervous system complications 10/12    PRES     Seizure disorder (H)      Seizures (H) 10/12    PRES     STD (sexually transmitted disease) 15 years    Herpes     Thyroid disease Savanah    About 20 years     Ulcer, gastric, acute 10/12 12/16     Wounds and injuries 5/10    Groin        Past Surgical History:   Procedure Laterality Date     COLONOSCOPY  10/16     ESOPHAGOSCOPY, GASTROSCOPY, DUODENOSCOPY (EGD), COMBINED N/A 9/26/2017    Procedure: COMBINED ESOPHAGOSCOPY, GASTROSCOPY, DUODENOSCOPY (EGD), BIOPSY SINGLE OR MULTIPLE;  EGD;  Surgeon:  Mango Gonzalez MD;  Location:  GI     ESOPHAGOSCOPY, GASTROSCOPY, DUODENOSCOPY (EGD), COMBINED N/A 12/12/2017    Procedure: COMBINED ESOPHAGOSCOPY, GASTROSCOPY, DUODENOSCOPY (EGD);  Upper Endo;  Surgeon: Mango Gonzalez MD;  Location:  GI     GENITOURINARY SURGERY  ,6/04. 8/10    Tubal. Ablation     LAPAROSCOPIC REVISION TYLER-EN-Y N/A 1/29/2018    Procedure: LAPAROSCOPIC REVISION TYLER-EN-Y;  Laparoscopic Reversal Of Tyler En Y Gastric Bypass, Hiatal Hernia Repair;  Surgeon: Mango Gonzalez MD;  Location:  OR     Family History   Problem Relation Age of Onset     Diabetes Father      Coronary Artery Disease Father      Hypertension Father      Hyperlipidemia Father      Colon Polyps Father      Obesity Father      Hypertension Mother      Hyperlipidemia Mother      Hypertension Maternal Grandmother      Cerebrovascular Disease Maternal Grandmother      Hypertension Paternal Grandmother      Cerebrovascular Disease Paternal Grandmother      Hypertension Paternal Grandfather      Aneurysm Paternal Grandfather      Colon Polyps Paternal Grandfather      Substance Abuse Paternal Grandfather      Breast Cancer Sister      Substance Abuse Maternal Grandfather       Social History     Social History     Marital status:      Spouse name: N/A     Number of children: N/A     Years of education: N/A     Social History Main Topics     Smoking status: Light Tobacco Smoker     Packs/day: 0.50     Years: 1.00     Types: Cigarettes     Start date: 7/1/2017     Smokeless tobacco: Never Used     Alcohol use Yes      Comment: Light     Drug use: No     Sexual activity: Yes     Partners: Male     Birth control/ protection: Female Surgical     Other Topics Concern     None     Social History Narrative     Employment/School: she attended regular classes, was a nurse, until 2012, now disabled.   Driving:  Not driving  Previous Evaluations for Epilepsy:  The patient used to live in [place].     Review of  Systems:  Lethargy / Tiredness:  Yes  Nausea / Vomiting:  Yes  Double Vision:  No  Depression:  Yes  Memory Problems:  Yes  Poor Balance:  Yes  Dizziness:  Yes, on and off  Appetite Changes:  No  Blurred Vision:  No  Sleep Changes:  No  Skin: negative  Respiratory: No shortness of breath and No cough  Cardiovascular: CP: negative  Have you experienced a traumatic fall related to your events: No records are available at this time. According to the patient and her  her MRIs in the past including the ones during admissions in 2012 and 2013 showed PRES. (according to  changes were significant and were not exclusive to the posterior areas and it sounded like it was also unusual to the neurologists there. This is all based on the patient and her 's remarks)    Current Outpatient Prescriptions   Medication Sig Dispense Refill     acetaminophen (TYLENOL) 325 MG tablet Take 325-650 mg by mouth every 6 hours as needed for mild pain       acyclovir (ZOVIRAX) 400 MG tablet Take 400 mg by mouth 5 times daily       BusPIRone HCl (BUSPAR PO) Take 30 mg by mouth 2 times daily        Cyanocobalamin (VITAMIN B-12 PO) Take 2,500 mcg by mouth daily       GABAPENTIN PO Take 800 mg by mouth 4 times daily       ketorolac (ACULAR) 0.5 % ophthalmic solution        LEVOTHYROXINE SODIUM PO Take 137 mcg by mouth daily        LISINOPRIL PO Take 10 mg by mouth daily       ofloxacin (OCUFLOX) 0.3 % ophthalmic solution        omeprazole (PRILOSEC) 20 MG CR capsule        prednisoLONE acetate (PRED FORTE) 1 % ophthalmic susp        promethazine (PHENERGAN) 25 MG tablet        PROMETHAZINE HCL RE Place 25 mg rectally daily       RANITIDINE HCL PO Take 150 mg by mouth 2 times daily       TOPIRAMATE PO Take 100 mg by mouth 3 times daily       traZODone (DESYREL) 150 MG tablet          Past AEDs:  AED - ANTIEPILEPTIC DRUGS 2/1/2018   TOPIRAMATE  MG TID   GABAPENTIN  MG 4x Daily       Exam:    BP 92/57 (BP Location: Left  "arm, Patient Position: Sitting, Cuff Size: Adult Regular)  Pulse 64  Ht 1.626 m (5' 4\")  Wt 86.2 kg (190 lb)  SpO2 97%  BMI 32.61 kg/m2     Wt Readings from Last 5 Encounters:   10/19/18 86.2 kg (190 lb)   10/02/18 85.7 kg (189 lb)   09/05/18 82 kg (180 lb 12.8 oz)   03/07/18 80.3 kg (177 lb 1.6 oz)   02/07/18 83.6 kg (184 lb 3.2 oz)     General Appearance: Alert, awake, no apparent distress  Mental status: The patient is oriented to different aspects of personal information place and time except the date.  Attention Span: Normal  Language/speech: No aphasia or dysarthria, speech is somewhat tangential.  Cranial nerves: Pupils are round and reactive to light, EOMI, facial sensation intact to light touch, face is symmetric, tongue is midline, hearing is intact to voice, shocks or there is normal.  Motor exam: Normal tone bulk and strength 5 out of 5, no pronator drift  Limb Sensation: Intact to light touch  DTRs: Symmetric, toes are downgoing  Gait: wide-based gait, spastic, trouble with toe and heel walking and heel to toe     Assessment and Plan:    53-year-old right-handed woman with history of fibromyalgia, anxiety, depression, migraine and history of gastric bypass surgery PRES and seizures. Her first seizures happened in 2012, 3 days after gastric bypass surgery.  The patient was in [place] at that time and I do not have any records.  According to the patient and her  she got intubated because she had back to back seizures.  Since then she had recurrent PRES. there was were convulsive seizures, which she has not had any for the past couple years.  She currently has occasional sudden brief body jerks with no impaired awareness.  She is currently taking topiramate 100 mg 3 times a day, mainly for headaches and gabapentin 800 mg 4 times a day for\" nerve pain\" and back pain. I discussed doing an EEG for potential seizure tendency and MRI brain to look for structural abnormalities which may cause " seizures.  She is taking TPM and GBP at this time, which were primarily for pain and headache. If her EEG is normal and PRES was the cause of her seizures, since most of the time, it resolves, so if her MRI also come back normal, she does not need antiepileptic medications, although if TPM and GBP help with her headaches and pain, may continue taking them.     - 3 hour video EEG  - Lance MRI with seizure protocol  - RTC in 6 months      As described above, I met with the patient for 50 minutes and during this time counseling was greater than 50% of the visit time.  Whit Beltre MD

## 2018-10-19 NOTE — NURSING NOTE
"Savanah Payton's goals for this visit include:   Chief Complaint   Patient presents with     Consult     She requests these members of her care team be copied on today's visit information: pcp    PCP: Miranda Zacarias    Referring Provider:  Jose Elias Payton DO  [facility]  [address]  [place] [address]    BP 92/57 (BP Location: Left arm, Patient Position: Sitting, Cuff Size: Adult Regular)  Pulse 64  Ht 1.626 m (5' 4\")  Wt 86.2 kg (190 lb)  SpO2 97%  BMI 32.61 kg/m2    Do you need any medication refills at today's visit? N  "

## 2018-10-19 NOTE — MR AVS SNAPSHOT
After Visit Summary   10/19/2018    Savanah Payton    MRN: [id number]           Patient Information     Date Of Birth          1965        Visit Information        Provider Department      10/19/2018 10:00 AM Whit Beltre MD [facility]        Today's Diagnoses     Focal epilepsy (H)    -  1       Follow-ups after your visit        Follow-up notes from your care team     Return in about 6 months (around 4/19/2019).      Your next 10 appointments already scheduled     Oct 23, 2018 12:00 PM CDT   (Arrive by 11:45 AM)   Return Botox with Ana Huerta MD   [facility] ([facility])    [address]  [address] [facility] [address] [phone]            Oct 26, 2018  7:30 AM CDT   MR BRAIN W/O CONTRAST with MGMR1   [facility] ([facility])    [address] [place] [address] [phone]           How do I prepare for my exam? (Food and drink instructions) **If you will be receiving sedation or general anesthesia, please see special notes below.**  How do I prepare for my exam? (Other instructions) Take your medicines as usual, unless your doctor tells you not to. Please remove any body piercings and hair extensions before you arrive. Follow your doctor s orders. If you do not, we may have to postpone your exam. You may or may not receive IV contrast for this exam pending the discretion of the Radiologist.  You do not need to do anything special to prepare. **If you will be receiving sedation or general anesthesia, please see special notes below.**  What should I wear:  The MRI machine uses a strong magnet. Please wear clothes without metal (snaps, zippers). A sweatsuit works well, or we may give you a hospital gown.  How long does the exam take: Most tests take 30 to 60 minutes.  HOWEVER, IF YOUR DOCTOR PRESCRIBES ANESTHESIA please plan on spending four to five hours  in the recovery room.  What should I bring: Bring a list of your current medicines to your exam (including vitamins, minerals and over-the-counter drugs). Also bring the results of similar scans you may have had.  Do I need a : **If you will be receiving sedation or general anesthesia, please see special notes below.**  What should I do after the exam? No Restrictions, You may resume normal activities.  What is this test: MRI (magnetic resonance imaging) uses a strong magnet and radio waves to look inside the body. An MRA (magnetic resonance angiogram) does the same thing, but it lets us look at your blood vessels. A computer turns the radio waves into pictures showing cross sections of the body, much like slices of bread. This helps us see any problems more clearly.  Who should I call with questions: Please call the Imaging Department at your exam site with any questions. Directions, parking instructions, and other information is available on our website, [email or web address].  How do I prepare if I m having sedation or anesthesia? **IMPORTANT** THE INSTRUCTIONS BELOW ARE ONLY FOR THOSE PATIENTS WHO HAVE BEEN TOLD THEY WILL RECEIVE SEDATION OR GENERAL ANESTHESIA DURING THEIR MRI PROCEDURE:  IF YOU WILL RECEIVE SEDATION (take medicine to help you relax during your exam): You must get the medicine from your doctor before you arrive. Bring the medicine to the exam. Do not take it at home. Arrive one hour early. Bring someone who can take you home after the test. Your medicine will make you sleepy. After the exam, you may not drive, take a bus or take a taxi by yourself. No eating 8 hours before your exam. You may have clear liquids up until 4 hours before your exam. (Clear liquids include water, clear tea, black coffee and fruit juice without pulp.)  IF YOU WILL RECEIVE ANESTHESIA (be asleep for your exam): Arrive 1 1/2 hours early. Bring someone who can take you home after the test. You may not drive, take a  bus or take a taxi by yourself. No eating 8 hours before your exam. You may have clear liquids up until 4 hours before your exam. (Clear liquids include water, clear tea, black coffee and fruit juice without pulp.) You will spend four to five hours in the recovery room.            Nov 07, 2018  8:00 AM CST   EEG with [facility] EEG 1   [facility] ([facility])    [address] [place], [address]  [facility] [id number] [phone]            Apr 12, 2019  8:00 AM CDT   Return Visit with Whit Beltre MD   [facility] ([facility])    [address] [place] [address] [phone]              Future tests that were ordered for you today     Open Future Orders        Priority Expected Expires Ordered    MR Brain w/o Contrast Routine  10/19/2019 10/19/2018    EEG video monitoring Routine  11/28/2018 10/19/2018            Who to contact     If you have questions or need follow up information about today's clinic visit or your schedule please contact [facility] directly at [phone].  Normal or non-critical lab and imaging results will be communicated to you by MyChart, letter or phone within 4 business days after the clinic has received the results. If you do not hear from us within 7 days, please contact the clinic through [email or web address] or phone. If you have a critical or abnormal lab result, we will notify you by phone as soon as possible.  Submit refill requests through [employer] or call your pharmacy and they will forward the refill request to us. Please allow 3 business days for your refill to be completed.          Additional Information About Your Visit        [email or web address]     [employer] gives you secure access to your electronic health record. If you see a primary care provider, you can also send messages to your care team and make appointments. If you have questions, please call your primary care clinic.  If you do  "not have a primary care provider, please call [phone] and they will assist you.      [employer] is an electronic gateway that provides easy, online access to your medical records. With [employer], you can request a clinic appointment, read your test results, renew a prescription or communicate with your care team.     To access your existing account, please contact your [facility] or call [phone] for assistance.        Care EveryWhere ID     This is your Care EveryWhere ID. This could be used by other organizations to access your [facility] medical records  [id number]        Your Vitals Were     Pulse Height Pulse Oximetry BMI (Body Mass Index)          64 1.626 m (5' 4\") 97% 32.61 kg/m2         Blood Pressure from Last 3 Encounters:   10/19/18 92/57   10/02/18 99/66   09/05/18 117/55    Weight from Last 3 Encounters:   10/19/18 86.2 kg (190 lb)   10/02/18 85.7 kg (189 lb)   09/05/18 82 kg (180 lb 12.8 oz)               Primary Care Provider Office Phone # Fax #    Miranda Zacarias [phone]       [address] [address]        Equal Access to Services     VARSHA HARKINS AH: Hadii aad ku hadasho Soomaali, waaxda luqadaha, qaybta kaalmada adeegyada, waxay idiin hayaan adeeg khpetr laamanda ah. So [facility] [phone].    ATENCIÓN: Si habla español, tiene a kearns disposición servicios gratuitos de asistencia lingüística. Llame al [phone].    We comply with applicable federal civil rights laws and [place] laws. We do not discriminate on the basis of race, color, national origin, age, disability, sex, sexual orientation, or gender identity.            Thank you!     Thank you for choosing [facility]  for your care. Our goal is always to provide you with excellent care. Hearing back from our patients is one way we can continue to improve our services. Please take a few minutes to complete the written survey that you may " receive in the mail after your visit with us. Thank you!             Your Updated Medication List - Protect others around you: Learn how to safely use, store and throw away your medicines at www.disposemymeds.org.          This list is accurate as of 10/19/18 11:16 AM.  Always use your most recent med list.                   Brand Name Dispense Instructions for use Diagnosis    acyclovir 400 MG tablet    ZOVIRAX     Take 400 mg by mouth 5 times daily        BUSPAR PO      Take 30 mg by mouth 2 times daily        GABAPENTIN PO      Take 800 mg by mouth 4 times daily        ketorolac 0.5 % ophthalmic solution    ACULAR          LEVOTHYROXINE SODIUM PO      Take 137 mcg by mouth daily        LISINOPRIL PO      Take 10 mg by mouth daily        ofloxacin 0.3 % ophthalmic solution    OCUFLOX          prednisoLONE acetate 1 % ophthalmic susp    PRED FORTE          priLOSEC 20 MG CR capsule   Generic drug:  omeprazole           * PROMETHAZINE HCL RE      Place 25 mg rectally daily        * promethazine 25 MG tablet    PHENERGAN          RANITIDINE HCL PO      Take 150 mg by mouth 2 times daily        TOPIRAMATE PO      Take 100 mg by mouth 3 times daily        traZODone 150 MG tablet    DESYREL          TYLENOL 325 MG tablet   Generic drug:  acetaminophen      Take 325-650 mg by mouth every 6 hours as needed for mild pain        VITAMIN B-12 PO      Take 2,500 mcg by mouth daily        * Notice:  This list has 2 medication(s) that are the same as other medications prescribed for you. Read the directions carefully, and ask your doctor or other care provider to review them with you.

## 2018-10-19 NOTE — PROGRESS NOTES
"[facility] History and Physical       Patient:  Savanah Payton  :  1965   Age:  53 year old   Today's Office Visit:  10/19/2018    Referring Provider:    Jose Elias Payton DO  [facility]  [address]  [place] [address]    Introduction: the patient is a 53 year old right handed woman who presented for evaluation of seizures.  She is accompanied by her .      History of Present Illness:      The patient was diagnosed with PRES in .  She had seizures for 3 hours and got intubated. This happened 3 days after she had gastric bypass surgery. She was in [place] at that time and was treated at the [facility]. Since then she had recurrent PRES, including in .    She describes seizures that happened at that time.  She says she feels them coming on, she feels tight in her throat. She feels the twitching in the face mostly the right. Then she loses consciousness, but usually have enough time to tell she is going to have one, She says \"oh, Sh..\"occording to her  and then she flops around. It continues for 10 minutes. She was somewhat back to baseline in 10 minutes, but she had another one on the way to the hospital. She bit her tongue, no urinary incontinence. She was started on Dilantin in that hospitalization in , then was switched to topiramate.   She says when she was discharged from the hospital, she had weakness of her right side, weakness in her right arm resolved quickly, but right leg weakness continued for about a month.  She says still she doesn't feel her leg below the knee well.   She also had convulsions in  when she was hospitalized again for PRES. She also had low blood sugar at that time.  She hasn't had any convulsions for almost 2 years.     She also has spells with sudden body jerk, which are very brief. She doesn't lose awareness. She had them frequently approximately 3-4 times a day before coming to [place] in . Currently, " she only has them occasionally usually happen when she lays down.     Denies other types of seizures or seizure-like activities.    PMH: Fibromyalgia, gastric bypass surgery, depression, anxiety (treated by PCP, used to see a counselor in the past), hypothyroidism, back pain, herniated disc, ?stroke, migraines.    Epilepsy Risk Factors:  The patient denies history of meningitis, encephalitis, febrile seizures, family history of epilepsy or significant head injuries.  She had PRES in 2012 and 2013 when she had convulsive seizures.  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     Kidney stone Savanah    Many but no repeats in over 10 years     Migraines Savanah    Since age [age]     Other nervous system complications 10/12    PRES     Seizure disorder (H)      Seizures (H) 10/12    PRES     STD (sexually transmitted disease) 15 years    Herpes     Thyroid disease Savanah    About 20 years     Ulcer, gastric, acute 10/12 12/16     Wounds and injuries 5/10    Groin        Past Surgical History:   Procedure Laterality Date     COLONOSCOPY  10/16     ESOPHAGOSCOPY, GASTROSCOPY, DUODENOSCOPY (EGD), COMBINED N/A 9/26/2017    Procedure: COMBINED ESOPHAGOSCOPY, GASTROSCOPY, DUODENOSCOPY (EGD), BIOPSY SINGLE OR MULTIPLE;  EGD;  Surgeon: Mango Gonzalez MD;  Location: [facility]     ESOPHAGOSCOPY, GASTROSCOPY, DUODENOSCOPY (EGD), COMBINED N/A 12/12/2017    Procedure: COMBINED ESOPHAGOSCOPY, GASTROSCOPY, DUODENOSCOPY (EGD);  Upper Endo;  Surgeon: Mango Gonzalez MD;  Location:  GI      GENITOURINARY SURGERY  ,6/04. 8/10    Tubal. Ablation     LAPAROSCOPIC REVISION AARON-EN-Y N/A 1/29/2018    Procedure: LAPAROSCOPIC REVISION AARON-EN-Y;  Laparoscopic Reversal Of Aaron En Y Gastric Bypass, Hiatal Hernia Repair;  Surgeon: Mango Gonzalez MD;  Location: [facility] OR     Family History   Problem Relation Age of Onset     Diabetes Father      Coronary Artery Disease Father      Hypertension Father      Hyperlipidemia Father      Colon Polyps Father      Obesity Father      Hypertension Mother      Hyperlipidemia Mother      Hypertension Maternal Grandmother      Cerebrovascular Disease Maternal Grandmother      Hypertension Paternal Grandmother      Cerebrovascular Disease Paternal Grandmother      Hypertension Paternal Grandfather      Aneurysm Paternal Grandfather      Colon Polyps Paternal Grandfather      Substance Abuse Paternal Grandfather      Breast Cancer Sister      Substance Abuse Maternal Grandfather       Social History     Social History     Marital status:      Spouse name: N/A     Number of children: N/A     Years of education: N/A     Social History Main Topics     Smoking status: Light Tobacco Smoker     Packs/day: 0.50     Years: 1.00     Types: Cigarettes     Start date: 7/1/2017     Smokeless tobacco: Never Used     Alcohol use Yes      Comment: Light     Drug use: No     Sexual activity: Yes     Partners: Male     Birth control/ protection: Female Surgical     Other Topics Concern     None     Social History Narrative     Employment/School: she attended regular classes, was a nurse, until 2012, now disabled.   Driving:  Not driving  Previous Evaluations for Epilepsy:  The patient used to live in [place].     Review of Systems:  Lethargy / Tiredness:  Yes  Nausea / Vomiting:  Yes  Double Vision:  No  Depression:  Yes  Memory Problems:  Yes  Poor Balance:  Yes  Dizziness:  Yes, on and off  Appetite Changes:  No  Blurred Vision:  No  Sleep Changes:  No  Skin: negative  Respiratory:  "No shortness of breath and No cough  Cardiovascular: CP: negative  Have you experienced a traumatic fall related to your events: No records are available at this time. According to the patient and her  her MRIs in the past including the ones during admissions in 2012 and 2013 showed PRES. (according to  changes were significant and were not exclusive to the posterior areas and it sounded like it was also unusual to the neurologists there. This is all based on the patient and her 's remarks)    Current Outpatient Prescriptions   Medication Sig Dispense Refill     acetaminophen (TYLENOL) 325 MG tablet Take 325-650 mg by mouth every 6 hours as needed for mild pain       acyclovir (ZOVIRAX) 400 MG tablet Take 400 mg by mouth 5 times daily       BusPIRone HCl (BUSPAR PO) Take 30 mg by mouth 2 times daily        Cyanocobalamin (VITAMIN B-12 PO) Take 2,500 mcg by mouth daily       GABAPENTIN PO Take 800 mg by mouth 4 times daily       ketorolac (ACULAR) 0.5 % ophthalmic solution        LEVOTHYROXINE SODIUM PO Take 137 mcg by mouth daily        LISINOPRIL PO Take 10 mg by mouth daily       ofloxacin (OCUFLOX) 0.3 % ophthalmic solution        omeprazole (PRILOSEC) 20 MG CR capsule        prednisoLONE acetate (PRED FORTE) 1 % ophthalmic susp        promethazine (PHENERGAN) 25 MG tablet        PROMETHAZINE HCL RE Place 25 mg rectally daily       RANITIDINE HCL PO Take 150 mg by mouth 2 times daily       TOPIRAMATE PO Take 100 mg by mouth 3 times daily       traZODone (DESYREL) 150 MG tablet          Past AEDs:  AED - ANTIEPILEPTIC DRUGS 2/1/2018   TOPIRAMATE  MG TID   GABAPENTIN  MG 4x Daily       Exam:    BP 92/57 (BP Location: Left arm, Patient Position: Sitting, Cuff Size: Adult Regular)  Pulse 64  Ht 1.626 m (5' 4\")  Wt 86.2 kg (190 lb)  SpO2 97%  BMI 32.61 kg/m2     Wt Readings from Last 5 Encounters:   10/19/18 86.2 kg (190 lb)   10/02/18 85.7 kg (189 lb)   09/05/18 82 kg (180 lb " "12.8 oz)   03/07/18 80.3 kg (177 lb 1.6 oz)   02/07/18 83.6 kg (184 lb 3.2 oz)     General Appearance: Alert, awake, no apparent distress  Mental status: The patient is oriented to different aspects of personal information place and time except the date.  Attention Span: Normal  Language/speech: No aphasia or dysarthria, speech is somewhat tangential.  Cranial nerves: Pupils are round and reactive to light, EOMI, facial sensation intact to light touch, face is symmetric, tongue is midline, hearing is intact to voice, shocks or there is normal.  Motor exam: Normal tone bulk and strength 5 out of 5, no pronator drift  Limb Sensation: Intact to light touch  DTRs: Symmetric, toes are downgoing  Gait: wide-based gait, spastic, trouble with toe and heel walking and heel to toe     Assessment and Plan:    53-year-old right-handed woman with history of fibromyalgia, anxiety, depression, migraine and history of gastric bypass surgery PRES and seizures. Her first seizures happened in 2012, 3 days after gastric bypass surgery.  The patient was in [place] at that time and I do not have any records.  According to the patient and her  she got intubated because she had back to back seizures.  Since then she had recurrent PRES. there was were convulsive seizures, which she has not had any for the past couple years.  She currently has occasional sudden brief body jerks with no impaired awareness.  She is currently taking topiramate 100 mg 3 times a day, mainly for headaches and gabapentin 800 mg 4 times a day for\" nerve pain\" and back pain. I discussed doing an EEG for potential seizure tendency and MRI brain to look for structural abnormalities which may cause seizures.  She is taking TPM and GBP at this time, which were primarily for pain and headache. If her EEG is normal and PRES was the cause of her seizures, since most of the time, it resolves, so if her MRI also come back normal, she does not need antiepileptic " medications, although if TPM and GBP help with her headaches and pain, may continue taking them.     - 3 hour video EEG  - Lance MRI with seizure protocol  - RTC in 6 months      As described above, I met with the patient for 50 minutes and during this time counseling was greater than 50% of the visit time.  Whit Beltre MD

## 2018-10-23 ENCOUNTER — OFFICE VISIT (OUTPATIENT)
Dept: NEUROLOGY | Facility: CLINIC | Age: 53
End: 2018-10-23
Payer: COMMERCIAL

## 2018-10-23 DIAGNOSIS — G43.719 INTRACTABLE CHRONIC MIGRAINE WITHOUT AURA AND WITHOUT STATUS MIGRAINOSUS: Primary | ICD-10-CM

## 2018-10-23 NOTE — PROGRESS NOTES
The procedure was explained to the patient. Benefits of the treatment were discussed including headache and migraine reduction. Risks of the procedure were reviewed including but not limited to pain, bruising, bleeding, infection, weakness of muscles injected or those distal to injection. The patient voiced understanding of the risks and benefits. All questions answered and the patient consented to proceed.     Prior to receiving Botox injections the patient reported the following:  Baseline headache frequency:20-25 days a month  Baseline headache duration: 4-5 hours  Associated migrainous features:nausea, photophobia, phonophobia    Previous treatment trials:propranolol, amitriptyline, nortriptyline, Depakote, chiropractor, and Mg supplements    Last Botox procedure: With Dr Fito Waller  Current migraine frequency: 4-8 a month  Current migraine duration: 1 day    A 200 unit vial of Botox was diluted with 4ml of 0.9% sodium chloride    Botox lot #: [id number]  Botox expiration date: 02 2021  0.9% sodium chloride lot #: [id number]  0.9% sodium chloride expiration date:01 Jan 2020    The following muscles were injected using a 30 gauge needle:  Procerus 1 site 5 units   2 sites (bilat) 10 units  Frontalis 4 sites (bilat) 20 units  Temporalis 8 sites (bilat) 40 units  Trapezius muscles 6 sites (bilat) 30 units  Cervical paraspinals (bilat) 4 sites 20 units  Occipitalis 6 sites (bilat) 30 units    A total of 155 units were injected, 45 wasted.   The patient tolerated the injections well.  I was present for and performed the entire procedure.    Ana Huerta MD [facility]  Department of Neurology  Pager [phone]

## 2018-10-23 NOTE — NURSING NOTE
PER PROVIDER VITALS NOT NEEDED.  Chief Complaint   Patient presents with     RECHECK     botox     Ynes Bond

## 2018-10-23 NOTE — MR AVS SNAPSHOT
After Visit Summary   10/23/2018    Savanah Payton    MRN: [id number]           Patient Information     Date Of Birth          1965        Visit Information        Provider Department      10/23/2018 12:00 PM Ana Huerta MD [facility]        Today's Diagnoses     Intractable chronic migraine without aura and without status migrainosus    -  1       Follow-ups after your visit        Follow-up notes from your care team     Return if symptoms worsen or fail to improve.      Your next 10 appointments already scheduled     Oct 26, 2018  7:30 AM CDT   MR BRAIN W/O CONTRAST with MGMR1   [facility] ([facility]    [address] [place] [address] [phone]           How do I prepare for my exam? (Food and drink instructions) **If you will be receiving sedation or general anesthesia, please see special notes below.**  How do I prepare for my exam? (Other instructions) Take your medicines as usual, unless your doctor tells you not to. Please remove any body piercings and hair extensions before you arrive. Follow your doctor s orders. If you do not, we may have to postpone your exam. You may or may not receive IV contrast for this exam pending the discretion of the Radiologist.  You do not need to do anything special to prepare. **If you will be receiving sedation or general anesthesia, please see special notes below.**  What should I wear:  The MRI machine uses a strong magnet. Please wear clothes without metal (snaps, zippers). A sweatsuit works well, or we may give you a hospital gown.  How long does the exam take: Most tests take 30 to 60 minutes.  HOWEVER, IF YOUR DOCTOR PRESCRIBES ANESTHESIA please plan on spending four to five hours in the recovery room.  What should I bring: Bring a list of your current medicines to your exam (including vitamins, minerals and over-the-counter drugs). Also bring the results of similar scans  you may have had.  Do I need a : **If you will be receiving sedation or general anesthesia, please see special notes below.**  What should I do after the exam? No Restrictions, You may resume normal activities.  What is this test: MRI (magnetic resonance imaging) uses a strong magnet and radio waves to look inside the body. An MRA (magnetic resonance angiogram) does the same thing, but it lets us look at your blood vessels. A computer turns the radio waves into pictures showing cross sections of the body, much like slices of bread. This helps us see any problems more clearly.  Who should I call with questions: Please call the Imaging Department at your exam site with any questions. Directions, parking instructions, and other information is available on our website, [employer]/imaging.  How do I prepare if I m having sedation or anesthesia? **IMPORTANT** THE INSTRUCTIONS BELOW ARE ONLY FOR THOSE PATIENTS WHO HAVE BEEN TOLD THEY WILL RECEIVE SEDATION OR GENERAL ANESTHESIA DURING THEIR MRI PROCEDURE:  IF YOU WILL RECEIVE SEDATION (take medicine to help you relax during your exam): You must get the medicine from your doctor before you arrive. Bring the medicine to the exam. Do not take it at home. Arrive one hour early. Bring someone who can take you home after the test. Your medicine will make you sleepy. After the exam, you may not drive, take a bus or take a taxi by yourself. No eating 8 hours before your exam. You may have clear liquids up until 4 hours before your exam. (Clear liquids include water, clear tea, black coffee and fruit juice without pulp.)  IF YOU WILL RECEIVE ANESTHESIA (be asleep for your exam): Arrive 1 1/2 hours early. Bring someone who can take you home after the test. You may not drive, take a bus or take a taxi by yourself. No eating 8 hours before your exam. You may have clear liquids up until 4 hours before your exam. (Clear liquids include water, clear tea, black coffee and fruit  juice without pulp.) You will spend four to five hours in the recovery room.            Nov 07, 2018  8:00 AM CST   EEG with [facility] EEG 1   [facility] ([facility])    [address], [address]  [facility] [id number] [phone]            Apr 12, 2019  8:00 AM CDT   Return Visit with Whit Beltre MD   [facility]    [address] [place] [address] [phone]              Who to contact     Please call your clinic at [phone] to:    Ask questions about your health    Make or cancel appointments    Discuss your medicines    Learn about your test results    Speak to your doctor            Additional Information About Your Visit        [facility] Information     [facility] gives you secure access to your electronic health record. If you see a primary care provider, you can also send messages to your care team and make appointments. If you have questions, please call your primary care clinic.  If you do not have a primary care provider, please call [phone] and they will assist you.      [facility] [facility] is an electronic gateway that provides easy, online access to your medical records. With [employer] [facility], you can request a clinic appointment, read your test results, renew a prescription or communicate with your care team.     To access your existing account, please contact your [facility] or call [phone] for assistance.        Care EveryWhere ID     This is your Care EveryWhere ID. This could be used by other organizations to access your [place] medical records  [id number]         Blood Pressure from Last 3 Encounters:   10/19/18 92/57   10/02/18 99/66   09/05/18 117/55    Weight from Last 3 Encounters:   10/19/18 86.2 kg (190 lb)   10/02/18 85.7 kg (189 lb)   09/05/18 82 kg (180 lb 12.8 oz)              We Performed the Following     HC CHEMODENERVATE FACIAL,TRIGERM,NERV MIGRAINE         Primary Care Provider Office Phone # Fax #    Miranda Zacarias [place] [phone]       [address] [address]        Equal Access to Services     VARSHA HARKINS : Haddebra france fenton pedroo Socindaali, waaxda luqadaha, qaybta kaalmada adeguzmanyada, david lee laKashalyssa león. So [facility] [phone].    ATENCIÓN: Si habla español, tiene a kearns disposición servicios gratuitos de asistencia lingüística. Llame al [phone].    We comply with applicable federal civil rights laws and [place] laws. We do not discriminate on the basis of race, color, national origin, age, disability, sex, sexual orientation, or gender identity.            Thank you!     Thank you for choosing [facility]  for your care. Our goal is always to provide you with excellent care. Hearing back from our patients is one way we can continue to improve our services. Please take a few minutes to complete the written survey that you may receive in the mail after your visit with us. Thank you!             Your Updated Medication List - Protect others around you: Learn how to safely use, store and throw away your medicines at www.disposemymeds.org.          This list is accurate as of 10/23/18 12:51 PM.  Always use your most recent med list.                   Brand Name Dispense Instructions for use Diagnosis    acyclovir 400 MG tablet    ZOVIRAX     Take 400 mg by mouth 5 times daily        BUSPAR PO      Take 30 mg by mouth 2 times daily        GABAPENTIN PO      Take 800 mg by mouth 4 times daily        ketorolac 0.5 % ophthalmic solution    ACULAR          LEVOTHYROXINE SODIUM PO      Take 137 mcg by mouth daily        LISINOPRIL PO      Take 10 mg by mouth daily        ofloxacin 0.3 % ophthalmic solution    OCUFLOX          prednisoLONE acetate 1 % ophthalmic susp    PRED FORTE          priLOSEC 20 MG CR capsule   Generic drug:  omeprazole           * PROMETHAZINE HCL RE      Place 25 mg rectally  daily        * promethazine 25 MG tablet    PHENERGAN          RANITIDINE HCL PO      Take 150 mg by mouth 2 times daily        TOPIRAMATE PO      Take 100 mg by mouth 3 times daily        traZODone 150 MG tablet    DESYREL          TYLENOL 325 MG tablet   Generic drug:  acetaminophen      Take 325-650 mg by mouth every 6 hours as needed for mild pain        VITAMIN B-12 PO      Take 2,500 mcg by mouth daily        * Notice:  This list has 2 medication(s) that are the same as other medications prescribed for you. Read the directions carefully, and ask your doctor or other care provider to review them with you.

## 2018-10-23 NOTE — LETTER
10/23/2018       RE: Savanah Payton  [address]  [place] [address]     Dear Colleague,    Thank you for referring your patient, Savanah Payton, to the [facility] at [facility]. Please see a copy of my visit note below.    The procedure was explained to the patient. Benefits of the treatment were discussed including headache and migraine reduction. Risks of the procedure were reviewed including but not limited to pain, bruising, bleeding, infection, weakness of muscles injected or those distal to injection. The patient voiced understanding of the risks and benefits. All questions answered and the patient consented to proceed.     Prior to receiving Botox injections the patient reported the following:  Baseline headache frequency:20-25 days a month  Baseline headache duration: 4-5 hours  Associated migrainous features:nausea, photophobia, phonophobia    Previous treatment trials:propranolol, amitriptyline, nortriptyline, Depakote, chiropractor, and Mg supplements    Last Botox procedure: With Dr Fito Waller  Current migraine frequency: 4-8 a month  Current migraine duration: 1 day    A 200 unit vial of Botox was diluted with 4ml of 0.9% sodium chloride    Botox lot #: [id number]  Botox expiration date: 02 2021  0.9% sodium chloride lot #: [id number]  0.9% sodium chloride expiration date:01 Jan 2020    The following muscles were injected using a 30 gauge needle:  Procerus 1 site 5 units   2 sites (bilat) 10 units  Frontalis 4 sites (bilat) 20 units  Temporalis 8 sites (bilat) 40 units  Trapezius muscles 6 sites (bilat) 30 units  Cervical paraspinals (bilat) 4 sites 20 units  Occipitalis 6 sites (bilat) 30 units    A total of 155 units were injected, 45 wasted.   The patient tolerated the injections well.  I was present for and performed the entire procedure.    Ana Huerta MD [facility]  Department of Neurology  Pager [phone]

## 2018-10-26 ENCOUNTER — RADIANT APPOINTMENT (OUTPATIENT)
Dept: MRI IMAGING | Facility: CLINIC | Age: 53
End: 2018-10-26
Attending: PSYCHIATRY & NEUROLOGY
Payer: COMMERCIAL

## 2018-10-26 DIAGNOSIS — G40.109 FOCAL EPILEPSY (H): ICD-10-CM

## 2018-10-26 PROCEDURE — A9585 GADOBUTROL INJECTION: HCPCS | Performed by: PSYCHIATRY & NEUROLOGY

## 2018-10-26 PROCEDURE — 70553 MRI BRAIN STEM W/O & W/DYE: CPT | Performed by: RADIOLOGY

## 2018-10-26 RX ORDER — GADOBUTROL 604.72 MG/ML
10 INJECTION INTRAVENOUS ONCE
Status: COMPLETED | OUTPATIENT
Start: 2018-10-26 | End: 2018-10-26

## 2018-10-26 RX ADMIN — GADOBUTROL 9 ML: 604.72 INJECTION INTRAVENOUS at 07:45

## 2018-11-07 ENCOUNTER — ALLIED HEALTH/NURSE VISIT (OUTPATIENT)
Dept: NEUROLOGY | Facility: CLINIC | Age: 53
End: 2018-11-07
Payer: COMMERCIAL

## 2018-11-07 DIAGNOSIS — G40.109 FOCAL EPILEPSY (H): ICD-10-CM

## 2018-11-07 NOTE — MR AVS SNAPSHOT
After Visit Summary   11/7/2018    Savanah Payton    MRN: [id number]           Patient Information     Date Of Birth          1965        Visit Information        Provider Department      11/7/2018 8:00 AM [facility]        Today's Diagnoses     Focal epilepsy (H)           Follow-ups after your visit        Your next 10 appointments already scheduled     Apr 12, 2019  8:00 AM CDT   Return Visit with Whit Beltre MD   [facility] ([facility])    [address] [place] [id number] [phone]              Who to contact     Please call your clinic at [phone] to:    Ask questions about your health    Make or cancel appointments    Discuss your medicines    Learn about your test results    Speak to your doctor            Additional Information About Your Visit        MyChart Information     [facility] gives you secure access to your electronic health record. If you see a primary care provider, you can also send messages to your care team and make appointments. If you have questions, please call your primary care clinic.  If you do not have a primary care provider, please call [phone] and they will assist you.      [facility] is an electronic gateway that provides easy, online access to your medical records. With [facility], you can request a clinic appointment, read your test results, renew a prescription or communicate with your care team.     To access your existing account, please contact your [facility] or call [phone] for assistance.        Care EveryWhere ID     This is your Care EveryWhere ID. This could be used by other organizations to access your [place] medical records  [id number]         Blood Pressure from Last 3 Encounters:   No data found for BP    Weight from Last 3 Encounters:   No data found for Wt              Today, you had the following     No orders found for  display       Primary Care Provider Office Phone # Fax #    Miranda Zacarias [phone]       [address] [address]        Equal Access to Services     VARSHA HARKINS : Alfred fenton pedroo Sojm, waaxda luqadaha, qaybta kaalmada adepaulda, david lee laKashalyssa león. So [facility] [phone].    ATENCIÓN: Si habla español, tiene a kearns disposición servicios gratuitos de asistencia lingüística. Llame al [phone].    We comply with applicable federal civil rights laws and [place] laws. We do not discriminate on the basis of race, color, national origin, age, disability, sex, sexual orientation, or gender identity.            Thank you!     Thank you for choosing [facility]  for your care. Our goal is always to provide you with excellent care. Hearing back from our patients is one way we can continue to improve our services. Please take a few minutes to complete the written survey that you may receive in the mail after your visit with us. Thank you!             Your Updated Medication List - Protect others around you: Learn how to safely use, store and throw away your medicines at www.disposemymeds.org.          This list is accurate as of 11/7/18 11:59 PM.  Always use your most recent med list.                   Brand Name Dispense Instructions for use Diagnosis    acyclovir 400 MG tablet    ZOVIRAX     Take 400 mg by mouth 5 times daily        BUSPAR PO      Take 30 mg by mouth 2 times daily        GABAPENTIN PO      Take 800 mg by mouth 4 times daily        ketorolac 0.5 % ophthalmic solution    ACULAR          LEVOTHYROXINE SODIUM PO      Take 137 mcg by mouth daily        LISINOPRIL PO      Take 10 mg by mouth daily        ofloxacin 0.3 % ophthalmic solution    OCUFLOX          prednisoLONE acetate 1 % ophthalmic susp    PRED FORTE          priLOSEC 20 MG CR capsule   Generic drug:  omeprazole           * PROMETHAZINE HCL RE      Place 25  mg rectally daily        * promethazine 25 MG tablet    PHENERGAN          RANITIDINE HCL PO      Take 150 mg by mouth 2 times daily        TOPIRAMATE PO      Take 100 mg by mouth 3 times daily        traZODone 150 MG tablet    DESYREL          TYLENOL 325 MG tablet   Generic drug:  acetaminophen      Take 325-650 mg by mouth every 6 hours as needed for mild pain        VITAMIN B-12 PO      Take 2,500 mcg by mouth daily        * Notice:  This list has 2 medication(s) that are the same as other medications prescribed for you. Read the directions carefully, and ask your doctor or other care provider to review them with you.

## 2018-11-21 NOTE — PROCEDURES
Procedure Date: 11/07/2018      VIDEO EEG #:  [id number]      DATE OF STUDY:  11/07/2018      SOURCE FILE DURATION:  3 hours 7 minutes 12 seconds      CLINICAL SUMMARY:  The patient is a 53-year-old female with a history of PRES who underwent video EEG monitoring for evaluation of seizures.     TECHNICAL SUMMARY: This continuous video- EEG monitoring procedure was performed with 23 scalp electrodes in 10-20 electrode system placement, and additional scalp, precordial and other surface electrodes used for electrical referencing and artifact detection.  Video monitoring was utilized and periodically reviewed by EEG technologists and the physician for electroclinical correlations.     INTERICTAL ACTIVITIES:  During maximal wakefulness, there was 9-10 Hz alpha activity over the posterior head regions, which was symmetric and attenuated by eye opening.  There was occasional intermittent theta slowing during waking.  Drowsiness was manifested as dropout of the posterior dominant rhythm and diffuse theta activity and horizontal eye movements.  Stage 2 sleep was manifested as vertex waves, symmetric sleep spindles and K complexes.  No epileptiform discharges were present.      ACTIVATION MANEUVERS:Photic stimulation did not induce an abnormal activity on the video EEG.  Hyperventilation induced bursts of generalized delta slowing.      CLINICAL/ICTAL EVENTS:  No electrographic seizures were recorded.  The patient had some episodes of right shoulder twitches seen at 09:22:56 with no abnormal EEG correlate.      IMPRESSION:  This is an abnormal video EEG due to the presence of mild diffuse nonspecific encephalopathy.  No epileptiform discharges were present.  No electrographic seizures were recorded.  The patient had events of right shoulder twitches with no abnormal video EEG correlate suggesting these were not seizures.         INGRID PARRY MD             D: 11/20/2018   T: 11/20/2018   MT: meredith      Name:      JOHN MONROY   MRN:      [id number]        Account:        [id number]   :      1965           Procedure Date: 2018      Document: [id number]

## 2018-12-21 ENCOUNTER — MYC MEDICAL ADVICE (OUTPATIENT)
Dept: NEUROLOGY | Facility: CLINIC | Age: 53
End: 2018-12-21

## 2018-12-21 DIAGNOSIS — G43.709 CHRONIC MIGRAINE WITHOUT AURA WITHOUT STATUS MIGRAINOSUS, NOT INTRACTABLE: Primary | ICD-10-CM

## 2018-12-22 RX ORDER — AMITRIPTYLINE HYDROCHLORIDE 10 MG/1
TABLET ORAL
Qty: 270 TABLET | Refills: 3 | Status: SHIPPED | OUTPATIENT
Start: 2018-12-22 | End: 2019-10-18

## 2019-04-26 ENCOUNTER — OFFICE VISIT (OUTPATIENT)
Dept: NEUROLOGY | Facility: CLINIC | Age: 54
End: 2019-04-26
Payer: COMMERCIAL

## 2019-04-26 VITALS
SYSTOLIC BLOOD PRESSURE: 105 MMHG | DIASTOLIC BLOOD PRESSURE: 58 MMHG | WEIGHT: 224.1 LBS | BODY MASS INDEX: 38.47 KG/M2 | OXYGEN SATURATION: 98 % | HEART RATE: 67 BPM

## 2019-04-26 DIAGNOSIS — G40.109 FOCAL EPILEPSY (H): Primary | ICD-10-CM

## 2019-04-26 PROCEDURE — 99000 SPECIMEN HANDLING OFFICE-LAB: CPT | Performed by: PSYCHIATRY & NEUROLOGY

## 2019-04-26 PROCEDURE — 99214 OFFICE O/P EST MOD 30 MIN: CPT | Performed by: PSYCHIATRY & NEUROLOGY

## 2019-04-26 PROCEDURE — 80201 ASSAY OF TOPIRAMATE: CPT | Mod: 90 | Performed by: PSYCHIATRY & NEUROLOGY

## 2019-04-26 PROCEDURE — 36415 COLL VENOUS BLD VENIPUNCTURE: CPT | Performed by: PSYCHIATRY & NEUROLOGY

## 2019-04-26 PROCEDURE — 80171 DRUG SCREEN QUANT GABAPENTIN: CPT | Mod: 90 | Performed by: PSYCHIATRY & NEUROLOGY

## 2019-04-26 NOTE — NURSING NOTE
Savanah Payton's goals for this visit include:   Chief Complaint   Patient presents with     RECHECK       She requests these members of her care team be copied on today's visit information: yes    PCP: Miranda Zacarias    Referring Provider:  No referring provider defined for this encounter.    /58   Pulse 67   Wt 101.7 kg (224 lb 1.6 oz)   SpO2 98%   BMI 38.47 kg/m      Do you need any medication refills at today's visit? No     Amorrae TAMIKO Grover

## 2019-04-26 NOTE — LETTER
2019         RE: Savanah Payton  [address]   [address]  [facility] [address]        Dear Colleague,    Thank you for referring your patient, Savanah Payton, to the [facility]. Please see a copy of my visit note below.     NEUROLOGY PROGRESS NOTE   [facility]    Patient:Savanah Payton  : 1965  Age: 53 year old  Today's Office Visit: 2019    History of present illness:     The patient states she had a weird week last week. For 5 days, she felt foggy and slept for about 18 hours on those days. She still has twitches mainly on the right arm>leg and also face and rarely on the left. She had one event during the EEG with no abnormal EEG correlate. She states that one was similar to the ones she has at home.  She has anxiety and depression. She doesn't see a psychiatrist or psychologist.     Migraine headaches: she is taking amitriptyline 30 mg at night. She is still having headaches 3 times a week. She sees Dr. Huerta for her headaches. They are much better controlled now and is acceptable for her now. HAs usually last 3 hours and she is active during them. She was also getting Botox, but insurance doesn't pay for it; she wasn't getting any better than what she gets with nortriptyline anyway. She is taking  mg tid for headaches and seizures.     Neuropathy: She is taking  mg tid. It helps the pain. She used to be on very high dose of narcotics for pain, Oxycodone, fentanyl,... that much that she was passing out. Once she passed out in the BR and hit her head to the toilet; therefore she stopped taking them cold turkey.       Current Outpatient Medications   Medication Sig Dispense Refill     acetaminophen (TYLENOL) 325 MG tablet Take 325-650 mg by mouth every 6 hours as needed for mild pain       acyclovir (ZOVIRAX) 400 MG tablet Take 400 mg by mouth 5 times daily       amitriptyline (ELAVIL) 10 MG tablet 10 mg by mouth at night x 1 week then 20 mg at night x 1 week  then 30 mg at night 270 tablet 3     BusPIRone HCl (BUSPAR PO) Take 30 mg by mouth 2 times daily        Cyanocobalamin (VITAMIN B-12 PO) Take 2,500 mcg by mouth daily       GABAPENTIN PO Take 800 mg by mouth 4 times daily       ketorolac (ACULAR) 0.5 % ophthalmic solution        LEVOTHYROXINE SODIUM PO Take 137 mcg by mouth daily        LISINOPRIL PO Take 10 mg by mouth daily       ofloxacin (OCUFLOX) 0.3 % ophthalmic solution        omeprazole (PRILOSEC) 20 MG CR capsule        prednisoLONE acetate (PRED FORTE) 1 % ophthalmic susp        promethazine (PHENERGAN) 25 MG tablet        PROMETHAZINE HCL RE Place 25 mg rectally daily       RANITIDINE HCL PO Take 150 mg by mouth 2 times daily       TOPIRAMATE PO Take 100 mg by mouth 3 times daily       traZODone (DESYREL) 150 MG tablet        Review of Systems:    Lethargy / Tiredness:  Yes  Sleepiness:  Yes  Nausea / Vomiting:  No  Blurred Vision:  No  Double Vision:  No  Depression:  Yes  Anxiety: Yes  Slowed Cognitive Function:  Yes  Memory Problems:  Yes  Poor Balance:  Yes  Dizziness:  No  Appetite Changes:  No  Sleep Changes:  No  Headache: Yes  Behavioral Changes:  No  Rash: negative  Respiratory: No shortness of breath and No cough  Cardiovascular: negative  Have you experienced a traumatic fall related to your events: No  Are these falls related to your seizures: No      Exam:    /58   Pulse 67   Wt 101.7 kg (224 lb 1.6 oz)   SpO2 98%   BMI 38.47 kg/m        Wt Readings from Last 5 Encounters:   04/26/19 101.7 kg (224 lb 1.6 oz)   10/19/18 86.2 kg (190 lb)   10/02/18 85.7 kg (189 lb)   09/05/18 82 kg (180 lb 12.8 oz)   03/07/18 80.3 kg (177 lb 1.6 oz)       General Appearance: Alert, awake, cooperative, NAD  Gait: wide-based gait, unable to do tandem gait  Attention Span:  Normal  Language/speech: no aphasia or dysarthria  Extraocular Movements:  Normal  Coordination:  Normal FNF  Facial Sensation:  Normal  Facial Strength:  Normal  Tongue Strength:   Normal  Motor Exam: normal tone, bulk and strength 5/5 bilaterally    Assessment/Plan:    Spells, body twitches, recorded on the vEEG, not correlated with seizures. Brain MRI abnormal showing encephalomalacia/gliosis in the bilateral frontoparietal and occipital distributions. EEG showed no epileptiform discharges. Due to structural abnormalities might be prone to have seizures. She is taking  mg tid (for seizures and headache) and  mg tid (primarily for neuropathy). Discussed admission to epilepsy monitoring unit. She states events don't happen often. It's possible that seizures are controlled on TPM and GBP, so will need to take her off these medications in the hospital which may worsen her neuropathy and headaches. Initially when we were talking about HAs, she said TPM was both for HAs and seizures, but then when we discussed admission to epilepsy monitoring unit she said is it possible to come off TPM completely if she didn't have seizures as it was given for seizures? I have no clear explanation for the 5 days of fogginess and too much sleep. Her  wasn't watching her close enough to notice anything unusual. I instructed to continue current medications as she doesn't seem to have clear seizures on them. Will consider admission to epilepsy monitoring unit if she had more spells concerning for seizures.      - Continue TPM and GBP as before (prescribed by her other providers)  - Obtain TPM and GBP levels  - RTC in 6 months.      As described above, I met with the patient for 25 minutes and during this time counseling was greater than 50% of the visit time.  Whit Beltre MD

## 2019-04-26 NOTE — PROGRESS NOTES
NEUROLOGY PROGRESS NOTE   [facility]    Patient:Savanah Payton  : 1965  Age: 53 year old  Today's Office Visit: 2019    History of present illness:     The patient states she had a weird week last week. For 5 days, she felt foggy and slept for about 18 hours on those days. She still has twitches mainly on the right arm>leg and also face and rarely on the left. She had one event during the EEG with no abnormal EEG correlate. She states that one was similar to the ones she has at home.  She has anxiety and depression. She doesn't see a psychiatrist or psychologist.     Migraine headaches: she is taking amitriptyline 30 mg at night. She is still having headaches 3 times a week. She sees Dr. Huerta for her headaches. They are much better controlled now and is acceptable for her now. HAs usually last 3 hours and she is active during them. She was also getting Botox, but insurance doesn't pay for it; she wasn't getting any better than what she gets with nortriptyline anyway. She is taking  mg tid for headaches and seizures.     Neuropathy: She is taking  mg tid. It helps the pain. She used to be on very high dose of narcotics for pain, Oxycodone, fentanyl,... that much that she was passing out. Once she passed out in the [place] and hit her head to the toilet; therefore she stopped taking them cold turkey.       Current Outpatient Medications   Medication Sig Dispense Refill     acetaminophen (TYLENOL) 325 MG tablet Take 325-650 mg by mouth every 6 hours as needed for mild pain       acyclovir (ZOVIRAX) 400 MG tablet Take 400 mg by mouth 5 times daily       amitriptyline (ELAVIL) 10 MG tablet 10 mg by mouth at night x 1 week then 20 mg at night x 1 week then 30 mg at night 270 tablet 3     BusPIRone HCl (BUSPAR PO) Take 30 mg by mouth 2 times daily        Cyanocobalamin (VITAMIN B-12 PO) Take 2,500 mcg by mouth daily       GABAPENTIN PO Take 800 mg by mouth 4 times daily       ketorolac (ACULAR)  0.5 % ophthalmic solution        LEVOTHYROXINE SODIUM PO Take 137 mcg by mouth daily        LISINOPRIL PO Take 10 mg by mouth daily       ofloxacin (OCUFLOX) 0.3 % ophthalmic solution        omeprazole (PRILOSEC) 20 MG CR capsule        prednisoLONE acetate (PRED FORTE) 1 % ophthalmic susp        promethazine (PHENERGAN) 25 MG tablet        PROMETHAZINE HCL RE Place 25 mg rectally daily       RANITIDINE HCL PO Take 150 mg by mouth 2 times daily       TOPIRAMATE PO Take 100 mg by mouth 3 times daily       traZODone (DESYREL) 150 MG tablet        Review of Systems:    Lethargy / Tiredness:  Yes  Sleepiness:  Yes  Nausea / Vomiting:  No  Blurred Vision:  No  Double Vision:  No  Depression:  Yes  Anxiety: Yes  Slowed Cognitive Function:  Yes  Memory Problems:  Yes  Poor Balance:  Yes  Dizziness:  No  Appetite Changes:  No  Sleep Changes:  No  Headache: Yes  Behavioral Changes:  No  Rash: negative  Respiratory: No shortness of breath and No cough  Cardiovascular: negative  Have you experienced a traumatic fall related to your events: No  Are these falls related to your seizures: No      Exam:    /58   Pulse 67   Wt 101.7 kg (224 lb 1.6 oz)   SpO2 98%   BMI 38.47 kg/m       Wt Readings from Last 5 Encounters:   04/26/19 101.7 kg (224 lb 1.6 oz)   10/19/18 86.2 kg (190 lb)   10/02/18 85.7 kg (189 lb)   09/05/18 82 kg (180 lb 12.8 oz)   03/07/18 80.3 kg (177 lb 1.6 oz)       General Appearance: Alert, awake, cooperative, NAD  Gait: wide-based gait, unable to do tandem gait  Attention Span:  Normal  Language/speech: no aphasia or dysarthria  Extraocular Movements:  Normal  Coordination:  Normal FNF  Facial Sensation:  Normal  Facial Strength:  Normal  Tongue Strength:  Normal  Motor Exam: normal tone, bulk and strength 5/5 bilaterally    Assessment/Plan:    Spells, body twitches, recorded on the vEEG, not correlated with seizures. Brain MRI abnormal showing encephalomalacia/gliosis in the bilateral frontoparietal  and occipital distributions. EEG showed no epileptiform discharges. Due to structural abnormalities might be prone to have seizures. She is taking  mg tid (for seizures and headache) and  mg tid (primarily for neuropathy). Discussed admission to epilepsy monitoring unit. She states events don't happen often. It's possible that seizures are controlled on TPM and GBP, so will need to take her off these medications in the hospital which may worsen her neuropathy and headaches. Initially when we were talking about HAs, she said TPM was both for HAs and seizures, but then when we discussed admission to epilepsy monitoring unit she said is it possible to come off TPM completely if she didn't have seizures as it was given for seizures? I have no clear explanation for the 5 days of fogginess and too much sleep. Her  wasn't watching her close enough to notice anything unusual. I instructed to continue current medications as she doesn't seem to have clear seizures on them. Will consider admission to epilepsy monitoring unit if she had more spells concerning for seizures.      - Continue TPM and GBP as before (prescribed by her other providers)  - Obtain TPM and GBP levels  - RTC in 6 months.      As described above, I met with the patient for 25 minutes and during this time counseling was greater than 50% of the visit time.  Whit Beltre MD

## 2019-04-27 LAB — TOPIRAMATE SERPL-MCNC: 13.1 UG/ML (ref 5–20)

## 2019-04-29 LAB — GABAPENTIN SERPLBLD-MCNC: 16.5 UG/ML (ref 4–16)

## 2019-10-18 ENCOUNTER — OFFICE VISIT (OUTPATIENT)
Dept: NEUROLOGY | Facility: CLINIC | Age: 54
End: 2019-10-18
Payer: COMMERCIAL

## 2019-10-18 VITALS
HEART RATE: 62 BPM | BODY MASS INDEX: 41.66 KG/M2 | OXYGEN SATURATION: 100 % | SYSTOLIC BLOOD PRESSURE: 90 MMHG | WEIGHT: 242.7 LBS | DIASTOLIC BLOOD PRESSURE: 62 MMHG

## 2019-10-18 DIAGNOSIS — E66.01 MORBID OBESITY (H): ICD-10-CM

## 2019-10-18 DIAGNOSIS — G43.019 INTRACTABLE MIGRAINE WITHOUT AURA AND WITHOUT STATUS MIGRAINOSUS: Primary | ICD-10-CM

## 2019-10-18 PROCEDURE — 99214 OFFICE O/P EST MOD 30 MIN: CPT | Performed by: PSYCHIATRY & NEUROLOGY

## 2019-10-18 RX ORDER — NORTRIPTYLINE HCL 25 MG
25 CAPSULE ORAL AT BEDTIME
Qty: 90 CAPSULE | Refills: 1 | Status: SHIPPED | OUTPATIENT
Start: 2019-10-18 | End: 2020-04-17

## 2019-10-18 ASSESSMENT — PAIN SCALES - GENERAL: PAINLEVEL: NO PAIN (0)

## 2019-10-18 NOTE — LETTER
"    10/18/2019         RE: Savanah Payton  [address]  [address]  [facility] [address]        Dear Colleague,    Thank you for referring your patient, Savanah Payton, to the [facility]. Please see a copy of my visit note below.     NEUROLOGY PROGRESS NOTE   [facility]    Patient:Savanah Payton  : 1965  Age: 54 year old  Today's Office Visit: 2019    Background History:   The patient was diagnosed with PRES in .  She had seizures for 3 hours and got intubated. This happened 3 days after she had gastric bypass surgery. She was in [place] at that time and was treated at the [facility]. Since then she had recurrent PRES, including in 2013.    Seizure-description: She says she feels them coming on, she feels tight in her throat. She feels the twitching in the face mostly the right. Then she loses consciousness, but usually have enough time to tell she is going to have one, She says \"oh, Sh..\"occording to her  and then she flops around. It continues for 10 minutes. She was somewhat back to baseline in 10 minutes, but she had another one on the way to the hospital. She bit her tongue, no urinary incontinence. She was started on Dilantin in that hospitalization in , then was switched to topiramate.   She says when she was discharged from the hospital, she had weakness of her right side, weakness in her right arm resolved quickly, but right leg weakness continued for about a month.  She says still she doesn't feel her leg below the knee well.   She also had convulsions in  when she was hospitalized again for PRES. She also had low blood sugar at that time.  She hasn't had any convulsions for almost 2 years.      She also has spells with sudden body jerk, which are very brief. She doesn't lose awareness. She had them frequently approximately 3-4 times a day before coming to [place] in . Currently, she only has them occasionally usually happen when she lays down. "     History of present illness:     Savanah is accompanied by her  in this visit today. She did not experience any seizures since last visit. She is taking  mg tid (for seizures and headache) and  mg tid (primarily for neuropathy).    Migraine headaches: they are somewhat better. She was getting Botox in the past, last time was last year. Now she is on nortriptyline 30 mg at night and it works a little better than Botox. She gets the HAs 2-3 times a week, and each last 1-2 hours and it's usually 4 in scale of 1 -10, the headache is usually in her forehead. In the past when she had migraines it was on the left side, was severe and debilitating and she was throwing up. These HAs are different and much milder. Rarely, she get her typical migraine.  She usually takes Tylenol for her headache.     She sleeps a lot and may even sleep the whole day. She has some dry mouth, and she feels tired.    Neuropathy: she has some shooting pain in her right leg. The more she is active, the worse it gets. She is taking  mg tid. Her GBP was checked by her PCP and it was ~23, so her PCP decreased it from 4 times to 3 times a day.     Current Outpatient Medications   Medication Sig Dispense Refill     acetaminophen (TYLENOL) 325 MG tablet Take 325-650 mg by mouth every 6 hours as needed for mild pain       acyclovir (ZOVIRAX) 400 MG tablet Take 400 mg by mouth 5 times daily       amitriptyline (ELAVIL) 10 MG tablet 10 mg by mouth at night x 1 week then 20 mg at night x 1 week then 30 mg at night 270 tablet 3     BusPIRone HCl (BUSPAR PO) Take 30 mg by mouth 2 times daily        Cyanocobalamin (VITAMIN B-12 PO) Take 2,500 mcg by mouth daily       GABAPENTIN PO Take 800 mg by mouth 3 times daily        LEVOTHYROXINE SODIUM PO Take 150 mcg by mouth daily        LISINOPRIL PO Take 10 mg by mouth daily       omeprazole (PRILOSEC) 20 MG CR capsule 2 times daily        promethazine (PHENERGAN) 25 MG tablet as needed         RANITIDINE HCL PO Take 150 mg by mouth as needed        TOPIRAMATE PO Take 100 mg by mouth 3 times daily       traZODone (DESYREL) 150 MG tablet        ketorolac (ACULAR) 0.5 % ophthalmic solution        ofloxacin (OCUFLOX) 0.3 % ophthalmic solution        prednisoLONE acetate (PRED FORTE) 1 % ophthalmic susp        PROMETHAZINE HCL RE Place 25 mg rectally daily       Review of Systems:    Lethargy / Tiredness:  No  Sleepiness:  No  Nausea / Vomiting:  Yes  Blurred Vision:  No  Double Vision:  No  Depression:  Yes  Anxiety: Yes  Memory Problems:  Yes  Poor Balance:  Yes  Dizziness:  No  Appetite Changes:  No  Sleep Changes:  No  Headache: Yes  Behavioral Changes:  No  Rash: negative  Respiratory: No shortness of breath and No cough  Cardiovascular: negative  Have you experienced a traumatic fall related to your events: No    Exam:    BP 90/62 (BP Location: Left arm, Patient Position: Sitting, Cuff Size: Adult Large)   Pulse 62   Wt 110.1 kg (242 lb 11.2 oz)   SpO2 100%   BMI 41.66 kg/m        Wt Readings from Last 5 Encounters:   10/18/19 110.1 kg (242 lb 11.2 oz)   04/26/19 101.7 kg (224 lb 1.6 oz)   10/19/18 86.2 kg (190 lb)   10/02/18 85.7 kg (189 lb)   09/05/18 82 kg (180 lb 12.8 oz)     General Appearance: Alert, awake, cooperative, NAD  Gait: wide-based gait, unable to do tandem gait  Attention Span:  Normal  Language/speech: no aphasia or dysarthria  Extraocular Movements:  Normal  Coordination:  Normal FNF  Facial Sensation:  Normal  Facial Strength:  Normal  Tongue Strength:  Normal  Motor Exam: normal tone, bulk and strength 5/5 bilaterally    Assessment/Plan:  1. History of PRES, Spells,  of body twitches, recorded on the vEEG, not correlated with seizures. Brain MRI abnormal showing encephalomalacia/gliosis in the bilateral frontoparietal and occipital distributions. EEG showed no epileptiform discharges. Due to structural abnormalities might be prone to have seizures. She is taking  mg  tid (for seizures and headache) and  mg tid (primarily for neuropathy). Admission to epilepsy monitoring unit was discussed in the past, but it was deferred.     2. Migraine Headaches: Headaches are somewhat better on Pamelor.     - Continue AEDs as before.  - Take Pamelor 25 mg/hs.  - RTC in 6 months.          As described above, I met with the patient for 25 minutes and during this time counseling was greater than 50% of the visit time.  Whit Beltre MD

## 2019-10-18 NOTE — NURSING NOTE
Savanah Payton's goals for this visit include:   Chief Complaint   Patient presents with     RECHECK     6 month follow up       She requests these members of her care team be copied on today's visit information:     PCP: Miranda Zacarias    Referring Provider:  No referring provider defined for this encounter.    BP 90/62 (BP Location: Left arm, Patient Position: Sitting, Cuff Size: Adult Large)   Pulse 62   Wt 110.1 kg (242 lb 11.2 oz)   SpO2 100%   BMI 41.66 kg/m      Do you need any medication refills at today's visit? No

## 2019-10-18 NOTE — PROGRESS NOTES
" NEUROLOGY PROGRESS NOTE   [facility]    Patient:Savanah Payton  : 1965  Age: 54 year old  Today's Office Visit: 2019    Background History:   The patient was diagnosed with PRES in .  She had seizures for 3 hours and got intubated. This happened 3 days after she had gastric bypass surgery. She was in [place] at that time and was treated at the [facility]. Since then she had recurrent PRES, including in .    Seizure-description: She says she feels them coming on, she feels tight in her throat. She feels the twitching in the face mostly the right. Then she loses consciousness, but usually have enough time to tell she is going to have one, She says \"oh, Sh..\"occording to her  and then she flops around. It continues for 10 minutes. She was somewhat back to baseline in 10 minutes, but she had another one on the way to the hospital. She bit her tongue, no urinary incontinence. She was started on Dilantin in that hospitalization in , then was switched to topiramate.   She says when she was discharged from the hospital, she had weakness of her right side, weakness in her right arm resolved quickly, but right leg weakness continued for about a month.  She says still she doesn't feel her leg below the knee well.   She also had convulsions in  when she was hospitalized again for PRES. She also had low blood sugar at that time.  She hasn't had any convulsions for almost 2 years.      She also has spells with sudden body jerk, which are very brief. She doesn't lose awareness. She had them frequently approximately 3-4 times a day before coming to [place] in . Currently, she only has them occasionally usually happen when she lays down.     History of present illness:     Savanah is accompanied by her  in this visit today. She did not experience any seizures since last visit. She is taking  mg tid (for seizures and headache) and  mg tid (primarily for " neuropathy).    Migraine headaches: they are somewhat better. She was getting Botox in the past, last time was last year. Now she is on nortriptyline 30 mg at night and it works a little better than Botox. She gets the HAs 2-3 times a week, and each last 1-2 hours and it's usually 4 in scale of 1 -10, the headache is usually in her forehead. In the past when she had migraines it was on the left side, was severe and debilitating and she was throwing up. These HAs are different and much milder. Rarely, she get her typical migraine.  She usually takes Tylenol for her headache.     She sleeps a lot and may even sleep the whole day. She has some dry mouth, and she feels tired.    Neuropathy: she has some shooting pain in her right leg. The more she is active, the worse it gets. She is taking  mg tid. Her GBP was checked by her PCP and it was ~23, so her PCP decreased it from 4 times to 3 times a day.     Current Outpatient Medications   Medication Sig Dispense Refill     acetaminophen (TYLENOL) 325 MG tablet Take 325-650 mg by mouth every 6 hours as needed for mild pain       acyclovir (ZOVIRAX) 400 MG tablet Take 400 mg by mouth 5 times daily       amitriptyline (ELAVIL) 10 MG tablet 10 mg by mouth at night x 1 week then 20 mg at night x 1 week then 30 mg at night 270 tablet 3     BusPIRone HCl (BUSPAR PO) Take 30 mg by mouth 2 times daily        Cyanocobalamin (VITAMIN B-12 PO) Take 2,500 mcg by mouth daily       GABAPENTIN PO Take 800 mg by mouth 3 times daily        LEVOTHYROXINE SODIUM PO Take 150 mcg by mouth daily        LISINOPRIL PO Take 10 mg by mouth daily       omeprazole (PRILOSEC) 20 MG CR capsule 2 times daily        promethazine (PHENERGAN) 25 MG tablet as needed        RANITIDINE HCL PO Take 150 mg by mouth as needed        TOPIRAMATE PO Take 100 mg by mouth 3 times daily       traZODone (DESYREL) 150 MG tablet        ketorolac (ACULAR) 0.5 % ophthalmic solution        ofloxacin (OCUFLOX) 0.3 %  ophthalmic solution        prednisoLONE acetate (PRED FORTE) 1 % ophthalmic susp        PROMETHAZINE HCL RE Place 25 mg rectally daily       Review of Systems:    Lethargy / Tiredness:  No  Sleepiness:  No  Nausea / Vomiting:  Yes  Blurred Vision:  No  Double Vision:  No  Depression:  Yes  Anxiety: Yes  Memory Problems:  Yes  Poor Balance:  Yes  Dizziness:  No  Appetite Changes:  No  Sleep Changes:  No  Headache: Yes  Behavioral Changes:  No  Rash: negative  Respiratory: No shortness of breath and No cough  Cardiovascular: negative  Have you experienced a traumatic fall related to your events: No    Exam:    BP 90/62 (BP Location: Left arm, Patient Position: Sitting, Cuff Size: Adult Large)   Pulse 62   Wt 110.1 kg (242 lb 11.2 oz)   SpO2 100%   BMI 41.66 kg/m       Wt Readings from Last 5 Encounters:   10/18/19 110.1 kg (242 lb 11.2 oz)   04/26/19 101.7 kg (224 lb 1.6 oz)   10/19/18 86.2 kg (190 lb)   10/02/18 85.7 kg (189 lb)   09/05/18 82 kg (180 lb 12.8 oz)     General Appearance: Alert, awake, cooperative, NAD  Gait: wide-based gait, unable to do tandem gait  Attention Span:  Normal  Language/speech: no aphasia or dysarthria  Extraocular Movements:  Normal  Coordination:  Normal FNF  Facial Sensation:  Normal  Facial Strength:  Normal  Tongue Strength:  Normal  Motor Exam: normal tone, bulk and strength 5/5 bilaterally    Assessment/Plan:  1. History of PRES, Spells,  of body twitches, recorded on the vEEG, not correlated with seizures. Brain MRI abnormal showing encephalomalacia/gliosis in the bilateral frontoparietal and occipital distributions. EEG showed no epileptiform discharges. Due to structural abnormalities might be prone to have seizures. She is taking  mg tid (for seizures and headache) and  mg tid (primarily for neuropathy). Admission to epilepsy monitoring unit was discussed in the past, but it was deferred.     2. Migraine Headaches: Headaches are somewhat better on Pamelor.      - Continue AEDs as before.  - Take Pamelor 25 mg/hs.  - RTC in 6 months.          As described above, I met with the patient for 25 minutes and during this time counseling was greater than 50% of the visit time.  Whit Beltre MD

## 2020-03-10 ENCOUNTER — HEALTH MAINTENANCE LETTER (OUTPATIENT)
Age: 55
End: 2020-03-10

## 2020-04-17 ENCOUNTER — VIRTUAL VISIT (OUTPATIENT)
Dept: NEUROLOGY | Facility: CLINIC | Age: 55
End: 2020-04-17
Payer: COMMERCIAL

## 2020-04-17 DIAGNOSIS — G43.019 INTRACTABLE MIGRAINE WITHOUT AURA AND WITHOUT STATUS MIGRAINOSUS: ICD-10-CM

## 2020-04-17 PROCEDURE — 99213 OFFICE O/P EST LOW 20 MIN: CPT | Mod: GT | Performed by: PSYCHIATRY & NEUROLOGY

## 2020-04-17 RX ORDER — NORTRIPTYLINE HCL 25 MG
25 CAPSULE ORAL AT BEDTIME
Qty: 90 CAPSULE | Refills: 1 | Status: SHIPPED | OUTPATIENT
Start: 2020-04-17 | End: 2020-10-09

## 2020-04-17 NOTE — PROGRESS NOTES
"NEUROLOGY PROGRESS NOTE   [facility]    Patient:Savanah Payton  : 1965  Age: 54 year old  Today's Virtual Visit: 2020    Savanah Payton is a 54 year old female who is being evaluated via a billable video visit.      The patient has been notified of following:     \"This video visit will be conducted via a call between you and your physician/provider. We have found that certain health care needs can be provided without the need for an in-person physical exam.  This service lets us provide the care you need with a video conversation.  If a prescription is necessary we can send it directly to your pharmacy.  If lab work is needed we can place an order for that and you can then stop by our lab to have the test done at a later time.    Video visits are billed at different rates depending on your insurance coverage.  Please reach out to your insurance provider with any questions.    If during the course of the call the physician/provider feels a video visit is not appropriate, you will not be charged for this service.\"    Patient has given verbal consent for Video visit? Yes    How would you like to obtain your AVS? Colin    Patient would like the video invitation sent by: Text to cell phone: [phone]       Essence Cash LPN    Video Start Time: 8:00 am    Additional provider notes:        History of present illness:     The video call was performed for a follow-up on patient's spells and headaches. The patient states she has been doing well. Had no spells, no tremors.     Her headaches have been better-controlled, 1-2 a week which aren't bad, 1-2 a month get bad.   Nortriptilyne is working very well, better than amitriptyline and even better than Botox. She doesn't want to go back to Botox.    Social: she has been helping her mom, who is 73 and has asthma a lot in these days.he has been shopping grocery for her.    Current Outpatient Medications   Medication Sig Dispense Refill     acetaminophen " (TYLENOL) 325 MG tablet Take 325-650 mg by mouth every 6 hours as needed for mild pain       BusPIRone HCl (BUSPAR PO) Take 30 mg by mouth 2 times daily        Cyanocobalamin (VITAMIN B-12 PO) Take 2,500 mcg by mouth daily       GABAPENTIN PO Take 800 mg by mouth 3 times daily        LEVOTHYROXINE SODIUM PO Take 150 mcg by mouth daily        LISINOPRIL PO Take 10 mg by mouth daily       nortriptyline (PAMELOR) 25 MG capsule Take 1 capsule (25 mg) by mouth At Bedtime 90 capsule 1     omeprazole (PRILOSEC) 20 MG CR capsule 2 times daily        promethazine (PHENERGAN) 25 MG tablet as needed        TOPIRAMATE PO Take 100 mg by mouth 3 times daily       traZODone (DESYREL) 150 MG tablet 150 mg At Bedtime        acyclovir (ZOVIRAX) 400 MG tablet Take 400 mg by mouth 5 times daily       ketorolac (ACULAR) 0.5 % ophthalmic solution        ofloxacin (OCUFLOX) 0.3 % ophthalmic solution        prednisoLONE acetate (PRED FORTE) 1 % ophthalmic susp        PROMETHAZINE HCL RE Place 25 mg rectally daily       RANITIDINE HCL PO Take 150 mg by mouth as needed          Review of Systems:    Review of system was performed and was positive for occasional headache, back pain, right leg and foot pain which is intermittent and depends on activity, she takes an extra as needed gabapentin when the pain gets worse.  She is also taking gabapentin 800 mg 3 times a day.  She denies cold or flu symptoms, diarrhea, dizziness, double/blurred vision    Limited exam:  Patient is alert and oriented, her speech is fluent and coherent no aphasia, her face is symmetric, extraocular movements are intact.      Assessment/Plan:  1. History of PRES, spells of tremors. During vEEG there were no epileptiform discharges. Spells of body twitches recorded on the vEEG, not correlated with seizures. Brain MRI abnormal showing encephalomalacia/gliosis in the bilateral frontoparietal and occipital distributions.  Due to structural abnormalities might be prone to  have seizures. She is taking topiramate 100 mg tid (HA and Szs) and  mg tid (Neuropathy and Szs).     2.  Migraine headaches: Much better controlled.  She is taking nortriptyline 25 mg daily.  She believes this has been helping even more than Botox.    -Topiramate and gabapentin as before  -Continue nortriptyline 25 mg nightly  -Return to clinic in 6 months        Video-Visit Details    Type of service:  Video Visit    Video End Time: 8:13    Originating Location (pt. Location): Home    Distant Location (provider location):  [facility]     Mode of Communication:  Video Conference via [employer]      I spent 13 minutes face to face with the patient. During this time,, I obtained medical history, discussed medical plan and answered patient's questions.  Whit Beltre MD

## 2020-06-08 ENCOUNTER — TRANSFERRED RECORDS (OUTPATIENT)
Dept: HEALTH INFORMATION MANAGEMENT | Facility: CLINIC | Age: 55
End: 2020-06-08

## 2020-06-08 ENCOUNTER — MEDICAL CORRESPONDENCE (OUTPATIENT)
Dept: HEALTH INFORMATION MANAGEMENT | Facility: CLINIC | Age: 55
End: 2020-06-08

## 2020-06-11 ENCOUNTER — TRANSCRIBE ORDERS (OUTPATIENT)
Dept: OTHER | Age: 55
End: 2020-06-11

## 2020-06-11 DIAGNOSIS — L73.2 HIDRADENITIS SUPPURATIVA: Primary | ICD-10-CM

## 2020-10-09 ENCOUNTER — OFFICE VISIT (OUTPATIENT)
Dept: NEUROLOGY | Facility: CLINIC | Age: 55
End: 2020-10-09
Payer: COMMERCIAL

## 2020-10-09 VITALS
RESPIRATION RATE: 12 BRPM | HEART RATE: 77 BPM | WEIGHT: 242 LBS | DIASTOLIC BLOOD PRESSURE: 67 MMHG | OXYGEN SATURATION: 98 % | BODY MASS INDEX: 41.54 KG/M2 | SYSTOLIC BLOOD PRESSURE: 97 MMHG

## 2020-10-09 DIAGNOSIS — G43.019 INTRACTABLE MIGRAINE WITHOUT AURA AND WITHOUT STATUS MIGRAINOSUS: ICD-10-CM

## 2020-10-09 PROCEDURE — 99214 OFFICE O/P EST MOD 30 MIN: CPT | Performed by: PSYCHIATRY & NEUROLOGY

## 2020-10-09 RX ORDER — NORTRIPTYLINE HCL 25 MG
25 CAPSULE ORAL AT BEDTIME
Qty: 90 CAPSULE | Refills: 3 | Status: SHIPPED | OUTPATIENT
Start: 2020-10-09 | End: 2022-08-22

## 2020-10-09 RX ORDER — TOPIRAMATE 100 MG/1
TABLET, FILM COATED ORAL
Qty: 270 TABLET | Refills: 3 | Status: SHIPPED | OUTPATIENT
Start: 2020-10-09

## 2020-10-09 NOTE — PROGRESS NOTES
" NEUROLOGY PROGRESS NOTE   [facility]    Patient:Savanah Payton  : 1965  Age: 55 year old  Today's Office Visit: 2020    Background History:     The patient was diagnosed with PRES in .  She had seizures for 3 hours and got intubated. This happened 3 days after she had gastric bypass surgery. She was in [place] at that time and was treated at the [facility]. Since then she had recurrent PRES, including in 2013.    Seizure-description: She says she feels them coming on, she feels tight in her throat. She feels the twitching in the face mostly the right. Then she loses consciousness, but usually have enough time to tell she is going to have one, She says \"oh, Sh..\"occording to her  and then she flops around. It continues for 10 minutes. She was somewhat back to baseline in 10 minutes, but she had another one on the way to the hospital. She bit her tongue, no urinary incontinence. She was started on Dilantin in that hospitalization in , then was switched to topiramate.   She says when she was discharged from the hospital, she had weakness of her right side, weakness in her right arm resolved quickly, but right leg weakness continued for about a month.  She says still she doesn't feel her leg below the knee well.   She also had convulsions in  when she was hospitalized again for PRES. She also had low blood sugar at that time.  She hasn't had any convulsions for almost 2 years.      She also has spells with sudden body jerk, which are very brief. She doesn't lose awareness. She had them frequently approximately 3-4 times a day before coming to [place] in . Currently, she only has them occasionally usually happen when she lays down.     History of present illness:     Savanah is here for a follow up on her spells and headaches. She says she had body jerks once in a while. Her headaches are better-controlled, has been happening once a week. It's in the middle of head, it's " different than her usual migraines which were happening in the past. Her migraines were always on one side of her head. 5 out of 10, she denies nausea, dizziness, vision changes, photophobia or phonophobia. She had these symptoms with her migraines. She gets a migraine 3-4 times a year .  They usually subside if she takes a nap. She is taking topiramate 100 mg tid, nortriptyline 25 mg daily.   She is taking Tylenol every day for her back pain.  She is also taking gabapentin (manily for pain and neuropathy) 800 mg qid.    Current Outpatient Medications   Medication Sig Dispense Refill     GABAPENTIN PO Take 800 mg by mouth 3 times daily        acetaminophen (TYLENOL) 325 MG tablet Take 325-650 mg by mouth every 6 hours as needed for mild pain       acyclovir (ZOVIRAX) 400 MG tablet Take 400 mg by mouth 5 times daily       BusPIRone HCl (BUSPAR PO) Take 30 mg by mouth 2 times daily        Cyanocobalamin (VITAMIN B-12 PO) Take 2,500 mcg by mouth daily       ketorolac (ACULAR) 0.5 % ophthalmic solution        LEVOTHYROXINE SODIUM PO Take 150 mcg by mouth daily        LISINOPRIL PO Take 10 mg by mouth daily       nortriptyline (PAMELOR) 25 MG capsule Take 1 capsule (25 mg) by mouth At Bedtime 90 capsule 1     ofloxacin (OCUFLOX) 0.3 % ophthalmic solution        omeprazole (PRILOSEC) 20 MG CR capsule 2 times daily        prednisoLONE acetate (PRED FORTE) 1 % ophthalmic susp        promethazine (PHENERGAN) 25 MG tablet as needed        PROMETHAZINE HCL RE Place 25 mg rectally daily       RANITIDINE HCL PO Take 150 mg by mouth as needed        TOPIRAMATE PO Take 100 mg by mouth 3 times daily       traZODone (DESYREL) 150 MG tablet 150 mg At Bedtime        Review of Systems:    Lethargy / Tiredness:  yes  Sleepiness:  Yes  Blurred Vision:  No  Double Vision:  No  Poor Balance:  Yes, chronic  Dizziness:  No  Have you experienced a traumatic fall: No    Exam:    BP 97/67   Pulse 77   Resp 12   Wt 109.8 kg (242 lb)   SpO2  98%   BMI 41.54 kg/m       Wt Readings from Last 5 Encounters:   10/09/20 109.8 kg (242 lb)   10/18/19 110.1 kg (242 lb 11.2 oz)   04/26/19 101.7 kg (224 lb 1.6 oz)   10/19/18 86.2 kg (190 lb)   10/02/18 85.7 kg (189 lb)     General Appearance: Alert, awake, NAD    NEUROLOGICAL EXAM:  Gait:  steady  Attention Span:  Normal  Language: No aphasia or dysarthria  Extraocular Movements:  Normal  Coordination:  Normal finger to nose  Motor exam: Normal tone, bulk and strength 5/5    Assessment/Plan:    1. History of PRES, Spells,  of body twitches, recorded on the vEEG, not correlated with seizures. Brain MRI abnormal showing encephalomalacia/gliosis in the bilateral frontoparietal and occipital distributions. EEG showed no epileptiform discharges. Due to structural abnormalities might be prone to have seizures. She is taking  mg tid (for seizures and headache) and  mg tid (primarily for neuropathy). Admission to epilepsy monitoring unit was discussed in the past, but it was deferred. I suggested she can take topiramate once or twice a day because of long half life.      2. Headaches: migraines are well-controlled, she may have them 3-4 times a year. Current headaches are different. They happen once a week, moderate pain. She feels they are better controlled on nortriptyline.      - Continue gabapentin as before.  - Take topiramate 100 mg am and 200 mg pm.  - Continue nortriptyline 25 mg/hs.  - RTC in 6 months.      As described above, I met with the patient for 25 minutes and during this time counseling was greater than 50% of the visit time.  Whit Beltre MD

## 2020-10-09 NOTE — LETTER
"    10/9/2020         RE: Savanah Payton  [address]  [address]  [facility] [address]        Dear Colleague,    Thank you for referring your patient, Savanah Payton, to the [facility] NEUROLOGY CLINIC [place]. Please see a copy of my visit note below.     NEUROLOGY PROGRESS NOTE   [place]    Patient:Savanah Payton  : 1965  Age: 55 year old  Today's Office Visit: 2020    Background History:     The patient was diagnosed with PRES in .  She had seizures for 3 hours and got intubated. This happened 3 days after she had gastric bypass surgery. She was in [place] at that time and was treated at the [facility]. Since then she had recurrent PRES, including in 2013.    Seizure-description: She says she feels them coming on, she feels tight in her throat. She feels the twitching in the face mostly the right. Then she loses consciousness, but usually have enough time to tell she is going to have one, She says \"oh, Sh..\"occording to her  and then she flops around. It continues for 10 minutes. She was somewhat back to baseline in 10 minutes, but she had another one on the way to the hospital. She bit her tongue, no urinary incontinence. She was started on Dilantin in that hospitalization in , then was switched to topiramate.   She says when she was discharged from the hospital, she had weakness of her right side, weakness in her right arm resolved quickly, but right leg weakness continued for about a month.  She says still she doesn't feel her leg below the knee well.   She also had convulsions in  when she was hospitalized again for PRES. She also had low blood sugar at that time.  She hasn't had any convulsions for almost 2 years.      She also has spells with sudden body jerk, which are very brief. She doesn't lose awareness. She had them frequently approximately 3-4 times a day before coming to [place] in . Currently, she only has them occasionally usually " happen when she lays down.     History of present illness:     Savanah is here for a follow up on her spells and headaches. She says she had body jerks once in a while. Her headaches are better-controlled, has been happening once a week. It's in the middle of head, it's different than her usual migraines which were happening in the past. Her migraines were always on one side of her head. 5 out of 10, she denies nausea, dizziness, vision changes, photophobia or phonophobia. She had these symptoms with her migraines. She gets a migraine 3-4 times a year .  They usually subside if she takes a nap. She is taking topiramate 100 mg tid, nortriptyline 25 mg daily.   She is taking Tylenol every day for her back pain.  She is also taking gabapentin (manily for pain and neuropathy) 800 mg qid.    Current Outpatient Medications   Medication Sig Dispense Refill     GABAPENTIN PO Take 800 mg by mouth 3 times daily        acetaminophen (TYLENOL) 325 MG tablet Take 325-650 mg by mouth every 6 hours as needed for mild pain       acyclovir (ZOVIRAX) 400 MG tablet Take 400 mg by mouth 5 times daily       BusPIRone HCl (BUSPAR PO) Take 30 mg by mouth 2 times daily        Cyanocobalamin (VITAMIN B-12 PO) Take 2,500 mcg by mouth daily       ketorolac (ACULAR) 0.5 % ophthalmic solution        LEVOTHYROXINE SODIUM PO Take 150 mcg by mouth daily        LISINOPRIL PO Take 10 mg by mouth daily       nortriptyline (PAMELOR) 25 MG capsule Take 1 capsule (25 mg) by mouth At Bedtime 90 capsule 1     ofloxacin (OCUFLOX) 0.3 % ophthalmic solution        omeprazole (PRILOSEC) 20 MG CR capsule 2 times daily        prednisoLONE acetate (PRED FORTE) 1 % ophthalmic susp        promethazine (PHENERGAN) 25 MG tablet as needed        PROMETHAZINE HCL RE Place 25 mg rectally daily       RANITIDINE HCL PO Take 150 mg by mouth as needed        TOPIRAMATE PO Take 100 mg by mouth 3 times daily       traZODone (DESYREL) 150 MG tablet 150 mg At Bedtime        Review  of Systems:    Lethargy / Tiredness:  yes  Sleepiness:  Yes  Blurred Vision:  No  Double Vision:  No  Poor Balance:  Yes, chronic  Dizziness:  No  Have you experienced a traumatic fall: No    Exam:    BP 97/67   Pulse 77   Resp 12   Wt 109.8 kg (242 lb)   SpO2 98%   BMI 41.54 kg/m       Wt Readings from Last 5 Encounters:   10/09/20 109.8 kg (242 lb)   10/18/19 110.1 kg (242 lb 11.2 oz)   04/26/19 101.7 kg (224 lb 1.6 oz)   10/19/18 86.2 kg (190 lb)   10/02/18 85.7 kg (189 lb)     General Appearance: Alert, awake, NAD    NEUROLOGICAL EXAM:  Gait:  steady  Attention Span:  Normal  Language: No aphasia or dysarthria  Extraocular Movements:  Normal  Coordination:  Normal finger to nose  Motor exam: Normal tone, bulk and strength 5/5    Assessment/Plan:    1. History of PRES, Spells,  of body twitches, recorded on the vEEG, not correlated with seizures. Brain MRI abnormal showing encephalomalacia/gliosis in the bilateral frontoparietal and occipital distributions. EEG showed no epileptiform discharges. Due to structural abnormalities might be prone to have seizures. She is taking  mg tid (for seizures and headache) and  mg tid (primarily for neuropathy). Admission to epilepsy monitoring unit was discussed in the past, but it was deferred. I suggested she can take topiramate once or twice a day because of long half life.      2. Headaches: migraines are well-controlled, she may have them 3-4 times a year. Current headaches are different. They happen once a week, moderate pain. She feels they are better controlled on nortriptyline.      - Continue gabapentin as before.  - Take topiramate 100 mg am and 200 mg pm.  - Continue nortriptyline 25 mg/hs.  - RTC in 6 months.      As described above, I met with the patient for 25 minutes and during this time counseling was greater than 50% of the visit time.  Whit Beltre MD

## 2020-12-16 ENCOUNTER — VIRTUAL VISIT (OUTPATIENT)
Dept: ENDOCRINOLOGY | Facility: CLINIC | Age: 55
End: 2020-12-16
Payer: COMMERCIAL

## 2020-12-16 VITALS — HEIGHT: 65 IN | WEIGHT: 235 LBS | BODY MASS INDEX: 39.15 KG/M2

## 2020-12-16 DIAGNOSIS — Z98.84 BARIATRIC SURGERY STATUS: Primary | ICD-10-CM

## 2020-12-16 PROCEDURE — 99212 OFFICE O/P EST SF 10 MIN: CPT | Mod: 95 | Performed by: SURGERY

## 2020-12-16 ASSESSMENT — PAIN SCALES - GENERAL: PAINLEVEL: NO PAIN (0)

## 2020-12-16 ASSESSMENT — MIFFLIN-ST. JEOR: SCORE: 1662.86

## 2020-12-16 NOTE — LETTER
"12/16/2020       RE: Savanah Payton  [address]   [address]  [facility] [address]     Dear Colleague,    Thank you for referring your patient, Savanah Payton, to the [facility] WEIGHT MANAGEMENT [facility]INIC [place] [facility]. Please see a copy of my visit note below.    Savanah Payton is a 55 year old female who is being evaluated via a billable video visit.      The patient has been notified of following:     \"This video visit will be conducted via a call between you and your physician/provider. We have found that certain health care needs can be provided without the need for an in-person physical exam.  This service lets us provide the care you need with a video conversation.  If a prescription is necessary we can send it directly to your pharmacy.  If lab work is needed we can place an order for that and you can then stop by our lab to have the test done at a later time.    Video visits are billed at different rates depending on your insurance coverage.  Please reach out to your insurance provider with any questions.    If during the course of the call the physician/provider feels a video visit is not appropriate, you will not be charged for this service.\"    Patient has given verbal consent for Video visit? Yes  How would you like to obtain your AVS? MyChart  If you are dropped from the video visit, the video invite should be resent to: Text to cell phone: [phone]  Will anyone else be joining your video visit? No        Video-Visit Details    This was a at 10-minute [facility] video visit.  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  This was done laparoscopically and we excised the proximal Aaron limb.  The patient has done well.  At the time of her reversal she was 190 pounds she dropped down to 170 pounds and is currently at 235 pounds.  She is concerned about her weight regain.  She has " tried the keto diet and actually is on topiramate because of her seizure disorder.  As related to her reversal and ulcer disease she was having symptoms of ulcers a month ago but having her medication doses normalized her symptoms have completely resolved.  We had a discussion about next steps around weight management and I advised that she might do best in working with our weight management team.  She may be a candidate for additional medication therapy as well as nutritional support to help manage her weight.  She seemed eager to try this and I will have our staff contact her.      Again, thank you for allowing me to participate in the care of your patient.      Sincerely,    Mango Gonzalez MD

## 2020-12-16 NOTE — PROGRESS NOTES
"Savanah Payton is a 55 year old female who is being evaluated via a billable video visit.      The patient has been notified of following:     \"This video visit will be conducted via a call between you and your physician/provider. We have found that certain health care needs can be provided without the need for an in-person physical exam.  This service lets us provide the care you need with a video conversation.  If a prescription is necessary we can send it directly to your pharmacy.  If lab work is needed we can place an order for that and you can then stop by our lab to have the test done at a later time.    Video visits are billed at different rates depending on your insurance coverage.  Please reach out to your insurance provider with any questions.    If during the course of the call the physician/provider feels a video visit is not appropriate, you will not be charged for this service.\"    Patient has given verbal consent for Video visit? Yes  How would you like to obtain your AVS? MyChart  If you are dropped from the video visit, the video invite should be resent to: Text to cell phone: [phone]  Will anyone else be joining your video visit? No        Video-Visit Details    This was a at 10-minute [facility] video visit.  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  This was done laparoscopically and we excised the proximal Aaron limb.  The patient has done well.  At the time of her reversal she was 190 pounds she dropped down to 170 pounds and is currently at 235 pounds.  She is concerned about her weight regain.  She has tried the keto diet and actually is on topiramate because of her seizure disorder.  As related to her reversal and ulcer disease she was having symptoms of ulcers a month ago but having her medication doses normalized her symptoms have completely resolved.  We had a discussion about next steps around weight management " and I advised that she might do best in working with our weight management team.  She may be a candidate for additional medication therapy as well as nutritional support to help manage her weight.  She seemed eager to try this and I will have our staff contact her.

## 2020-12-16 NOTE — NURSING NOTE
"Chief Complaint   Patient presents with     RECHECK     Follow up bariatric.       Vitals:    12/16/20 0813   Weight: 106.6 kg (235 lb)   Height: 1.645 m (5' 4.75\")       Body mass index is 39.41 kg/m .                            OTILIO LITTLEJOHN, EMT      "

## 2020-12-18 ENCOUNTER — TELEPHONE (OUTPATIENT)
Dept: ENDOCRINOLOGY | Facility: CLINIC | Age: 55
End: 2020-12-18

## 2020-12-18 NOTE — TELEPHONE ENCOUNTER
REFERRAL INFORMATION:    Referring Provider:  Dr. Gonzalez    Referring Clinic:  [facility] Weight Management    Reason for Visit/Diagnosis: Consult       FUTURE VISIT INFORMATION:    Appointment Date: 12.21.20    Appointment Time: 4:30 PM     NOTES RECORD STATUS  DETAILS   OFFICE NOTE from Referring Provider Internal 12.16.20 Dr. Gonzalez, [facility] Weight [facility]    OFFICE NOTE from Other Specialists N/A    HOSPITAL DISCHARGE SUMMARY/ ED VISITS  N/A    OPERATIVE REPORT N/A    ENDOSCOPY (EGD)  N/A    PERTINENT LABS Internal Last draw 4.26.19   PATHOLOGY REPORTS (RELATED) N/A    IMAGING (CT, MRI, US, XR)  N/A

## 2020-12-21 ENCOUNTER — VIRTUAL VISIT (OUTPATIENT)
Dept: ENDOCRINOLOGY | Facility: CLINIC | Age: 55
End: 2020-12-21
Payer: COMMERCIAL

## 2020-12-21 ENCOUNTER — PRE VISIT (OUTPATIENT)
Dept: ENDOCRINOLOGY | Facility: CLINIC | Age: 55
End: 2020-12-21

## 2020-12-21 VITALS — WEIGHT: 235 LBS | BODY MASS INDEX: 39.15 KG/M2 | HEIGHT: 65 IN

## 2020-12-21 DIAGNOSIS — E66.01 MORBID OBESITY (H): Primary | ICD-10-CM

## 2020-12-21 DIAGNOSIS — Z86.39 PERSONAL HISTORY OF OTHER ENDOCRINE, NUTRITIONAL AND METABOLIC DISEASE: ICD-10-CM

## 2020-12-21 DIAGNOSIS — K91.89 COMPLICATIONS OF GASTRIC BYPASS SURGERY: ICD-10-CM
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
## 2020-12-21 DIAGNOSIS — Z98.84 BARIATRIC SURGERY STATUS: ICD-10-CM

## 2020-12-21 PROCEDURE — 99214 OFFICE O/P EST MOD 30 MIN: CPT | Mod: 95 | Performed by: NURSE PRACTITIONER

## 2020-12-21 RX ORDER — OMEPRAZOLE 40 MG/1
40 CAPSULE, DELAYED RELEASE ORAL 2 TIMES DAILY
COMMUNITY
Start: 2020-11-14

## 2020-12-21 RX ORDER — BUSPIRONE HYDROCHLORIDE 30 MG/1
30 TABLET ORAL 2 TIMES DAILY
COMMUNITY
Start: 2020-11-16

## 2020-12-21 RX ORDER — LEVOTHYROXINE SODIUM 150 UG/1
150 TABLET ORAL DAILY
COMMUNITY
Start: 2020-11-16

## 2020-12-21 RX ORDER — LISINOPRIL 10 MG/1
10 TABLET ORAL DAILY
COMMUNITY
Start: 2020-11-12

## 2020-12-21 ASSESSMENT — PAIN SCALES - GENERAL: PAINLEVEL: NO PAIN (0)

## 2020-12-21 ASSESSMENT — MIFFLIN-ST. JEOR: SCORE: 1666.83

## 2020-12-21 NOTE — NURSING NOTE
"Chief Complaint   Patient presents with     Consult     New mwm.       Vitals:    12/21/20 1602   Weight: 106.6 kg (235 lb)   Height: 1.651 m (5' 5\")       Body mass index is 39.11 kg/m .                            OTILIO LITTLEJOHN, EMT    "

## 2020-12-21 NOTE — PATIENT INSTRUCTIONS
"Thank you for allowing us the privilege of caring for you. We hope we provided you with the excellent service you deserve.   Please let us know if there is anything else we can do for you so that we can be sure you are completely satisfied with your care experience.    To ensure the quality of our services you may be receiving a patient satisfaction survey from an independent patient satisfaction monitoring company.    The greatest compliment you can give is a \"Likely to Recommend\"    Your visit was with Brittanie Magaña NP today.    Instructions per today's visit:     Burton Payton, it was great to visit with you today.  Here is a review of our visit.  If our clinic scheduler is not able to reach you please call [phone] to schedule your next appointments.    - Work on decreasing portions at breakfast and dinner  - try protein shake at lunch   -Consider food journal- what you are eating, hunger level, mood   -follow up with dietitian   -labs when able  -follow up in 4-6 weeks       Information about Video Visits with [employer]: video visit information    Please call our contact center at [phone] to schedule your next appointments.  To find a lab location near you, please call [phone].  For any nursing questions or concerns call Chen Welsh LPN at [phone] or Jennifer Olsen RN at [phone]  Please call during clinic hours Monday through Friday 8:00a - 4:00p if you have questions or you can contact us via [employer] at anytime and we will reply during clinic hours.    Lab results will be communicated through My Chart or letter (if My Chart not used). Please call the clinic if you have not received communication after 1 week or if you have any questions.?  Clinic Fax: [phone]  ______________________________________________________________________________________________________________________  Meal Replacement Products:    Here is the link to our new e-store where you can purchase " our meal replacement products     HearMeOutview EBorderJumpStore  Food [email or web address].[email or web address]/store    The one week starter kit is a great way to sample a variety of products and see what works for you.    If you want more information about the product go to: Fresh Steps Meals  [email or web address]    If you are an employee or [facility] or [facility] please contact your care team for a 10% estore discount    Free Shipping for orders over $75     Benefits of meal replacements products:    Portion and calorie control  Improved nutrition  Structured eating  Simplified food choices  Avoid contact with trigger foods  _________________________________________________________________________________________________________________________________  Interested in working with a health ?  Health coaches work with you to improve your overall health and wellbeing.  They look at the whole person, and may involve discussion of different areas of life, including, but not limited to the four pillars of health (sleep, exercise, nutrition, and stress management). Discuss with your care team if you would like to start working a health .  Health Coaching-3 Pack: Schedule by calling [phone]    $99 for three health coaching visits    Visits may be done in person or via phone    Coaching is a partnership between the  and the client; Coaches do not prescribe or diagnose    Coaching helps inspire the client to reach his/her personal goals   _________________________________________________________________________________________________________________________________  SUPPORT GROUPS    [facility] Weight Loss Surgery Support Group    [facility] Weight Loss Surgery Support Group    The [facility] Weight Loss Surgery Support Group is a patient-lead forum that meets monthly. Due to Covid-19, we are meeting remotely from 5 PM - 6 PM via Microsoft Teams. The group  is facilitated by Deepika Pritchett, the program s Clinical Coordinator. The support group shares experiences, encouragement and education. It is open to those who have had weight loss surgery, are scheduled for surgery, and those who are considering surgery.   If you are interested in attending, please contact the clinic via [employer] or call the nurse line directly at [phone] to inform our staff that you would like an invite sent to you. Please let us know the email you would like the invite sent to. Prior to the meeting, a link with directions on how to join the meeting will be sent to you.    2020 Meetings December 2 - The group will not have a speaker. The focus will be to offer support to one another during the Holiday season.    2021 Meetings January 20 - Guest Speaker: Eliel Noonan RD, DILLON     February 17 - The group will not have a speaker. This will be a time of group support.     March 17 - Guest Speaker: Violeta Maloney, [facility], [facility], CPT Health     [facility] Support Groups    Connections: Bariatric Care Support Group?  This group takes place the second Tuesday of each month from 6:30 PM - 8 PM virtually using Microsoft Teams. It is led by Dragan Ellison, Ph.D who is a Licensed Psychologist with the [facility] Comprehensive Weight Management Program. There is no cost for group participation and it is open to all [facility] (and those external to this program) pre- and post- operative bariatric surgery patients as well as their support system.   Please send an email to Dragan Ellison, Ph.D., LP at?[email or web address]?if you would like an invitation to the group and to learn about using Microsoft Teams.    2020 Meetings     November 10 - Healthy Eating  Guest Speaker: Jessica Silva RD, LD     December 8    Connections: Post-Operative Bariatric Surgery Support Group  This support group meets the 4th Wednesday of the month from 11 AM - 12  PM virtually using Microsoft Teams. It is led by Donna Silva RN. This is a support group for [facility] bariatric patients (and those external to [facility]) who have had bariatric surgery and are at least 3 months post-surgery. There is no cost to attend and registration is not required. Please send an email to Donna Silva RN at [email or web address] if you would like an invitation to the group and to learn about using Microsoft Teams.    2020 Meetings November 25 December 23    [facility] Healthy Lifestyle Virtual Support Group    Healthy Lifestyle Virtual Support Group?    This group is held monthly on a Friday on the fourth Friday from 12:30 PM - to 1:30 PM. It is 60 minutes of small group guided discussion, support and resources led by National Board Certified Health , Donna Torres. All are welcome who want a healthy lifestyle. To receive monthly invites to the Healthy Lifestyle Virtual Support Group or if you have any questions about having a health  or attending support group, please email Donna [email or web address]. Donna will send out invites for each session, with the phone number and the conference ID number specific to that session.    2020 Meetings     November 13 - The Importance of Self Care and Connection During Covid December 18 - Open Forum    2021 Meetings January 29 - How to Stay Active and Healthy during the Winter Months    February 26 - Reading Food Labels: What do I Need to Know?  Guest Speaker: Nay Reyes RD    March 19 - Finding Health, Happiness and Confidence at Every Size  Guest Speaker: Nery Wills, Health Psychology Fellow    April 30 - Healthy Eating on a Budget  Guest Speaker: Paris Cortes RD    May 21 - Open Forum  _____________________________________________________________________________________________________________________________  [facility] Get Moving  Program  Our team of physical therapists is trained to help you understand and take control of your condition. They will perform a thorough evaluation to determine your ability for activity and develop a customized plan to fit your goals and physical ability.  Scheduling: Unsure if the Get Moving program is right for you? Discuss the program with your medical provider or diabetes educator. You can also call us at [phone] to ask questions or schedule an appointment.   [facility] Get Moving Program  ______________________________________________________________________________________________________________________  [facility] Diabetes Prevention Program (DPP)  If you have prediabetes and Medicare please contact us by MyChart or phone to learn more about our Diabetes Prevention Program  _______________________________________________________________________________________________________________________  Bluetooth Scale:    We hope to provide you with high quality telephone and virtual healthcare visits while social distancing for COVID-19 is necessary, as well as in the future when virtual visits may be more convenient for you.     Our technology team made it possible for Bluetooth scales to send weight measurements to our electronic medical record. This allows weights from you weighing at home to securely flow into the medical record, which will improve telephone and virtual visits.   Additionally, studies have shown that adults actually lose more weight when their weights are automatically sent to someone else, and also that this process is not stressful for those adults.    Below is a link for purchasing the scale, with a discount code for our patients. You may call your insurance company to see if they will reimburse you for the cost of the scale, as a piece of durable medical equipment. The scales only go up to a weight of 400 pounds. This is an issue and we are working with the developer on increasing  this. We found no scales that go over 400lb that have blue-tooth for connecting to Private Company.    Scale to purchase: the [employer]  Body  Scale: https://[email or web address]/[email or web address]/body/shop?[email or web address]=aw.ds    Discount Code: We have a discount code for our patients to bring the cost down to $50, Discount code is: [place]_Scale_20%off  Thank you,   [facility] Comprehensive Weight Management Team
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
## 2020-12-27 ENCOUNTER — HEALTH MAINTENANCE LETTER (OUTPATIENT)
Age: 55
End: 2020-12-27

## 2021-02-02 ENCOUNTER — TELEPHONE (OUTPATIENT)
Dept: ENDOCRINOLOGY | Facility: CLINIC | Age: 56
End: 2021-02-02

## 2021-02-17 ENCOUNTER — TRANSFERRED RECORDS (OUTPATIENT)
Dept: HEALTH INFORMATION MANAGEMENT | Facility: OTHER | Age: 56
End: 2021-02-17

## 2021-04-24 ENCOUNTER — HEALTH MAINTENANCE LETTER (OUTPATIENT)
Age: 56
End: 2021-04-24

## 2021-10-09 ENCOUNTER — HEALTH MAINTENANCE LETTER (OUTPATIENT)
Age: 56
End: 2021-10-09

## 2022-05-21 ENCOUNTER — HEALTH MAINTENANCE LETTER (OUTPATIENT)
Age: 57
End: 2022-05-21

## 2022-08-18 DIAGNOSIS — G43.019 INTRACTABLE MIGRAINE WITHOUT AURA AND WITHOUT STATUS MIGRAINOSUS: ICD-10-CM

## 2022-08-22 RX ORDER — NORTRIPTYLINE HCL 25 MG
25 CAPSULE ORAL AT BEDTIME
Qty: 90 CAPSULE | Refills: 3 | Status: SHIPPED | OUTPATIENT
Start: 2022-08-22

## 2022-08-22 NOTE — TELEPHONE ENCOUNTER
"  nortriptyline (PAMELOR) 25 MG capsule    Last Written Prescription Date:  10/9/20  Last Fill Quantity: 90,   # refills: 3  Last Office Visit : 10/9/20  Future Office visit:  None    \" RTC in 6 months\".    Scheduling has been notified to contact the pt for appointment.      Routing refill request to provider for review/approval because: gap in med rf. lv 10/20      "

## 2022-09-17 ENCOUNTER — HEALTH MAINTENANCE LETTER (OUTPATIENT)
Age: 57
End: 2022-09-17

## 2022-11-30 NOTE — TELEPHONE ENCOUNTER
Patient scheduled for EGD    Indication for procedure. Recurrent duodenal ulcer    Referring Provider. Mango Gonzalez MD    ? Not Needed    Arrival time verified? Yes, 0915     Facility location verified? Yes, [address]    Instructions given regarding prep and procedure    Prep Type NPO    Are you taking any anticoagulants or blood thinners? No    Instructions given? N/A    Electronic implanted devices? No    Pre procedure teaching completed? Yes    Transportation from procedure?     H&P / Pre op physical completed? N/A          
[Time Spent: ___ minutes] : I have spent [unfilled] minutes of time on the encounter.

## 2023-01-23 ENCOUNTER — HEALTH MAINTENANCE LETTER (OUTPATIENT)
Age: 58
End: 2023-01-23

## 2023-06-03 ENCOUNTER — HEALTH MAINTENANCE LETTER (OUTPATIENT)
Age: 58
End: 2023-06-03

## 2024-05-02 NOTE — PROGRESS NOTES
"Post-Operative check: 10:35 PM 1/29/2018   Name: Savanah Payton 52 year old female ID: [id number]      POD#0 s/p   Reversal of Aaron En Y Gastric Bypass, Hiatal Hernia Repair for an Unwanted RNYGB and a non-healing marginal ulcer.    S: Pt is alert and oriented, pt states that pain is well controlled. States she has already been up out of bed and ambulated to void. Denies any headache, chest pain, SOB, N/V, numbness/tingling of the extremities.    O:  /67 (BP Location: Right arm)  Pulse 77  Temp 98.2  F (36.8  C) (Oral)  Resp 15  Ht 1.626 m (5' 4\")  Wt 85.2 kg (187 lb 13.3 oz)  SpO2 97%  BMI 32.24 kg/m2  GEN: NAD  CV/PULM: Non labored on NC  Abdomen: Soft, appropriately tender.  Dressings clean and dry. No evidence of strikethrough on her abdominal dressings.  Extremities - no edema, non-tender, SCDs in place.    No results found for: HGB    A/P: 52 year old female who is s/p above named procedure. Recovering well following surgery.     - Had concerns about sleep, home trazodone ordered  - Further management per primary team.     Giuseppe Wheat, DO  General Surgery, PGY-1  Pg [phone]    " family informed

## (undated) DEVICE — SUCTION IRR STRYKERFLOW II W/TIP 250-070-520

## (undated) DEVICE — LINEN TOWEL PACK X30 5481

## (undated) DEVICE — STPL ENDO RELOAD 60MM MEDIUM THICK PURPLE EGIA60AMT

## (undated) DEVICE — DRAPE MAYO STAND 23X54 8337

## (undated) DEVICE — SU VICRYL 0 TIE 54" J608H

## (undated) DEVICE — ENDO SYSTEM WATER BOTTLE & TUBING W/CO2 FILTER 00711549

## (undated) DEVICE — CLIP APPLIER ENDO 10MM M/L 176657

## (undated) DEVICE — ENDO POUCH 5X9" 15MM ENDOCATCH II 173049

## (undated) DEVICE — SOL WATER IRRIG 1000ML BOTTLE 2F7114

## (undated) DEVICE — DRAPE SHEET REV FOLD 3/4 9349

## (undated) DEVICE — STPL SKIN 35W 059037

## (undated) DEVICE — Device

## (undated) DEVICE — PREP CHLORAPREP 26ML TINTED ORANGE  260815

## (undated) DEVICE — ENDO CANNULA 05MM VERSAONE UNIVERSAL UNVCA5STF

## (undated) DEVICE — SU VICRYL 3-0 SH 27" J316H

## (undated) DEVICE — ENDO TROCAR 15MM VERSAONE BLADELESS W/STD FIX CAN NONB15STF

## (undated) DEVICE — DEVICE ENDO STITCH APPLIER 10MM 173016

## (undated) DEVICE — STPL ENDO RELOAD SIG GIA BLACK 60MM X-TRA THICK SIG60AXT

## (undated) DEVICE — STPL ENDO RELOAD GIA 45X2MM ROTIC 030453

## (undated) DEVICE — ENDO TROCAR OPTICAL 12MM VERSAONE W/STD FIX CAN UNVCA12STF

## (undated) DEVICE — ENDO SHEARS 5MM 176643

## (undated) DEVICE — DEVICE SUTURE GRASPER TROCAR CLOSURE 14GA PMITCSG

## (undated) DEVICE — KIT ENDO FIRST STEP DISINFECTANT 200ML W/POUCH EP-4

## (undated) DEVICE — NDL INSUFFLATION 150MM VERRES 172016

## (undated) DEVICE — SYR PISTON URETHRAL 60ML 68000

## (undated) DEVICE — ENDO TROCAR 05MM VERSAONE BLADELESS W/STD FIX CAN NONB5STF

## (undated) DEVICE — ESU HARMONIC LAPAROSCOPIC SHEARS HD 1000I 36CM HARHD36

## (undated) DEVICE — NDL BLUNT 18GA 1.5" FILTER 305211

## (undated) DEVICE — TUBING SUCTION 10'X3/16" N510

## (undated) DEVICE — SYSTEM CLEARIFY VISUALIZATION 21-345

## (undated) DEVICE — DAVINCI XI SEAL UNIVERSAL 5-8MM 470361

## (undated) DEVICE — SU ENDO STITCH POLYSORB 3-0 7" UND 170072

## (undated) DEVICE — KIT CONNECTOR FOR OLYMPUS ENDOSCOPES DEFENDO 100310

## (undated) DEVICE — SUCTION MANIFOLD DORNOCH ULTRA CART UL-CL500

## (undated) DEVICE — CATH TRAY FOLEY SURESTEP 16FR W/URNE MTR STLK LATEX A303316A

## (undated) DEVICE — SU VICRYL 3-0 SH-1 27" J311H

## (undated) DEVICE — DAVINCI XI DRAPE ARM 470015

## (undated) DEVICE — ENDO POUCH GOLD 10MM ECATCH 173050G

## (undated) DEVICE — WIPES FOLEY CARE SURESTEP PROVON DFC100

## (undated) DEVICE — SU ENDO STITCH SURGIDAC 2-0 ES-9 7" TRIPLE STITCH 170041

## (undated) DEVICE — SOL NACL 0.9% INJ 1000ML BAG 07983-09

## (undated) DEVICE — LINEN TOWEL PACK X6 WHITE 5487

## (undated) DEVICE — SU SILK 3-0 SH 30" K832H

## (undated) DEVICE — SYR 03ML LL W/O NDL 309657

## (undated) DEVICE — ENDO TROCAR 12MM VERSAONE BLADELESS W/STD FIX CAN NONB12STF

## (undated) DEVICE — DAVINCI XI DRAPE COLUMN 470341

## (undated) DEVICE — STPL ENDO HANDLE GIA ULTRA UNIVERSAL XLONG EGIAUXL

## (undated) RX ORDER — DIPHENHYDRAMINE HYDROCHLORIDE 50 MG/ML
INJECTION INTRAMUSCULAR; INTRAVENOUS
Status: DISPENSED
Start: 2017-12-12

## (undated) RX ORDER — FENTANYL CITRATE 50 UG/ML
INJECTION, SOLUTION INTRAMUSCULAR; INTRAVENOUS
Status: DISPENSED
Start: 2018-01-29

## (undated) RX ORDER — CELECOXIB 50 MG/1
CAPSULE ORAL
Status: DISPENSED
Start: 2018-01-29

## (undated) RX ORDER — SIMETHICONE 40MG/0.6ML
SUSPENSION, DROPS(FINAL DOSAGE FORM)(ML) ORAL
Status: DISPENSED
Start: 2017-09-26

## (undated) RX ORDER — PROPOFOL 10 MG/ML
INJECTION, EMULSION INTRAVENOUS
Status: DISPENSED
Start: 2018-01-29

## (undated) RX ORDER — SODIUM CHLORIDE, SODIUM LACTATE, POTASSIUM CHLORIDE, CALCIUM CHLORIDE 600; 310; 30; 20 MG/100ML; MG/100ML; MG/100ML; MG/100ML
INJECTION, SOLUTION INTRAVENOUS
Status: DISPENSED
Start: 2018-01-29

## (undated) RX ORDER — ACETAMINOPHEN 325 MG/1
TABLET ORAL
Status: DISPENSED
Start: 2018-01-29

## (undated) RX ORDER — CEFAZOLIN SODIUM 1 G/3ML
INJECTION, POWDER, FOR SOLUTION INTRAMUSCULAR; INTRAVENOUS
Status: DISPENSED
Start: 2018-01-29

## (undated) RX ORDER — GABAPENTIN 300 MG/1
CAPSULE ORAL
Status: DISPENSED
Start: 2018-01-29

## (undated) RX ORDER — ONDANSETRON 2 MG/ML
INJECTION INTRAMUSCULAR; INTRAVENOUS
Status: DISPENSED
Start: 2018-01-29

## (undated) RX ORDER — ONDANSETRON 2 MG/ML
INJECTION INTRAMUSCULAR; INTRAVENOUS
Status: DISPENSED
Start: 2017-09-26

## (undated) RX ORDER — EPHEDRINE SULFATE 50 MG/ML
INJECTION, SOLUTION INTRAMUSCULAR; INTRAVENOUS; SUBCUTANEOUS
Status: DISPENSED
Start: 2018-01-29

## (undated) RX ORDER — SIMETHICONE 20 MG/.3ML
EMULSION ORAL
Status: DISPENSED
Start: 2017-12-12

## (undated) RX ORDER — LABETALOL HYDROCHLORIDE 5 MG/ML
INJECTION, SOLUTION INTRAVENOUS
Status: DISPENSED
Start: 2018-01-29

## (undated) RX ORDER — GLYCOPYRROLATE 0.2 MG/ML
INJECTION, SOLUTION INTRAMUSCULAR; INTRAVENOUS
Status: DISPENSED
Start: 2018-01-29

## (undated) RX ORDER — LIDOCAINE HYDROCHLORIDE 20 MG/ML
INJECTION, SOLUTION EPIDURAL; INFILTRATION; INTRACAUDAL; PERINEURAL
Status: DISPENSED
Start: 2018-01-29

## (undated) RX ORDER — DIPHENHYDRAMINE HYDROCHLORIDE 50 MG/ML
INJECTION INTRAMUSCULAR; INTRAVENOUS
Status: DISPENSED
Start: 2017-09-26

## (undated) RX ORDER — FENTANYL CITRATE 50 UG/ML
INJECTION, SOLUTION INTRAMUSCULAR; INTRAVENOUS
Status: DISPENSED
Start: 2017-09-26

## (undated) RX ORDER — FENTANYL CITRATE 50 UG/ML
INJECTION, SOLUTION INTRAMUSCULAR; INTRAVENOUS
Status: DISPENSED
Start: 2017-12-12

## (undated) RX ORDER — PHENYLEPHRINE HCL IN 0.9% NACL 1 MG/10 ML
SYRINGE (ML) INTRAVENOUS
Status: DISPENSED
Start: 2018-01-29

## (undated) RX ORDER — DEXAMETHASONE SODIUM PHOSPHATE 4 MG/ML
INJECTION, SOLUTION INTRA-ARTICULAR; INTRALESIONAL; INTRAMUSCULAR; INTRAVENOUS; SOFT TISSUE
Status: DISPENSED
Start: 2018-01-29

## (undated) RX ORDER — CEFAZOLIN SODIUM 2 G/100ML
INJECTION, SOLUTION INTRAVENOUS
Status: DISPENSED
Start: 2018-01-29